# Patient Record
Sex: FEMALE | Race: BLACK OR AFRICAN AMERICAN | NOT HISPANIC OR LATINO | Employment: OTHER | URBAN - METROPOLITAN AREA
[De-identification: names, ages, dates, MRNs, and addresses within clinical notes are randomized per-mention and may not be internally consistent; named-entity substitution may affect disease eponyms.]

---

## 2017-01-02 RX ORDER — MYCOPHENOLATE MOFETIL 500 MG/1
TABLET ORAL
Qty: 60 TABLET | Refills: 0 | Status: SHIPPED | OUTPATIENT
Start: 2017-01-02 | End: 2017-01-31 | Stop reason: SDUPTHER

## 2017-01-27 ENCOUNTER — TELEPHONE (OUTPATIENT)
Dept: TRANSPLANT | Facility: CLINIC | Age: 67
End: 2017-01-27

## 2017-01-31 RX ORDER — MYCOPHENOLATE MOFETIL 500 MG/1
500 TABLET ORAL 2 TIMES DAILY
Qty: 60 TABLET | Refills: 0 | Status: SHIPPED | OUTPATIENT
Start: 2017-01-31 | End: 2017-03-01 | Stop reason: SDUPTHER

## 2017-02-01 ENCOUNTER — TELEPHONE (OUTPATIENT)
Dept: TRANSPLANT | Facility: CLINIC | Age: 67
End: 2017-02-01

## 2017-02-01 NOTE — TELEPHONE ENCOUNTER
----- Message from April Mackey sent at 2/1/2017  4:22 PM CST -----  Contact: pt  I tried to call her back, but the phone rings and rings ( no voicemail )      ----- Message -----     From: Fatemeh Blanco     Sent: 1/31/2017   4:07 PM       To: April Mackey    She is due for labs, can you let her know?  Thanks.  ----- Message -----     From: Inge Chakraborty     Sent: 1/31/2017   3:38 PM       To: [facility] Post-Liver Transplant Clinical    When is her next appt for labs please call her @ # [phone]

## 2017-02-01 NOTE — TELEPHONE ENCOUNTER
----- Message from Fatemeh Blanco sent at 1/31/2017  4:07 PM CST -----  Contact: pt       ----- Message -----     From: Samantha Rodriges     Sent: 1/31/2017   3:49 PM       To: [facility] Post-Liver Transplant Clinical    ..Requesting prescription refill formycophenolate (CELLCEPT) 500 mg Tab, call once filled   [phone]

## 2017-02-15 ENCOUNTER — TELEPHONE (OUTPATIENT)
Dept: TRANSPLANT | Facility: CLINIC | Age: 67
End: 2017-02-15

## 2017-02-16 ENCOUNTER — TELEPHONE (OUTPATIENT)
Dept: TRANSPLANT | Facility: CLINIC | Age: 67
End: 2017-02-16

## 2017-02-16 NOTE — TELEPHONE ENCOUNTER
----- Message from April Mackey sent at 2/16/2017 10:14 AM CST -----  Contact: pt  Done, she's going to the lab Monday 2/20/17    ----- Message -----     From: April Mackey     Sent: 2/15/2017  12:20 PM       To: April Billings    I tried to call the patient, but her phone just rings. Do you want me to just schedule her an appt  with Yaneli . She hasn't been seen since 2015    ----- Message -----     From: April Mackey     Sent: 2/1/2017   4:22 PM       To: April Billings    I tried to call her back, but the phone rings and rings ( no voicemail )      ----- Message -----     From: Fatemeh Blanco     Sent: 1/31/2017   4:07 PM       To: April Mackey    She is due for labs, can you let her know?  Thanks.  ----- Message -----     From: Inge Chakraborty     Sent: 1/31/2017   3:38 PM       To: [facility] Post-Liver Transplant Clinical    When is her next appt for labs please call her @ # [phone]

## 2017-02-16 NOTE — TELEPHONE ENCOUNTER
----- Message from April Mackey sent at 2/16/2017 10:06 AM CST -----  I just spoke to the patient, I informed her that I scheduled her an appt with Yaneli on March 16, 2017 ( one month away). Ms. Stock said she can't always get a ride here because her two sisters work, I told her that's the reason I scheduled the appt a month from today so she could get her ride in order. I also informed her that it is important that she comes to this appt since she hasn't been seen by a Doctor since 2015.      ----- Message -----     From: Fatemeh Blanco     Sent: 2/15/2017  12:45 PM       To: April Mackey    You can schedule her with Yaneli for her annual. Thanks.

## 2017-02-27 RX ORDER — MYCOPHENOLATE MOFETIL 500 MG/1
TABLET ORAL
Qty: 180 TABLET | Refills: 3 | Status: CANCELLED | OUTPATIENT
Start: 2017-02-27

## 2017-03-01 RX ORDER — MYCOPHENOLATE MOFETIL 500 MG/1
500 TABLET ORAL 2 TIMES DAILY
Qty: 60 TABLET | Refills: 0 | Status: SHIPPED | OUTPATIENT
Start: 2017-03-01 | End: 2017-04-04 | Stop reason: SDUPTHER

## 2017-03-01 NOTE — TELEPHONE ENCOUNTER
----- Message from Samantha Rodriges sent at 2/27/2017  3:31 PM CST -----  Contact: melody Wong.Requesting prescription refill for mycophenolate (CELLCEPT) 500 mg Tab,  [phone]

## 2017-03-03 DIAGNOSIS — Z94.4 S/P LIVER TRANSPLANT: Primary | ICD-10-CM

## 2017-03-03 RX ORDER — TACROLIMUS 1 MG/1
CAPSULE ORAL
Qty: 720 CAPSULE | Refills: 0 | Status: SHIPPED | OUTPATIENT
Start: 2017-03-03 | End: 2017-06-05 | Stop reason: SDUPTHER

## 2017-03-10 LAB
EXT ALBUMIN: 4.1
EXT ALKALINE PHOSPHATASE: 63
EXT ALT: 8
EXT AST: 14
EXT BILIRUBIN TOTAL: 0.4
EXT BUN: 30
EXT CALCIUM: 9
EXT CHLORIDE: 108
EXT CO2: 25
EXT CREATININE: 1.4 MG/DL
EXT GFR MDRD AF AMER: 48
EXT GGT: 12
EXT GLUCOSE: 104
EXT HEMATOCRIT: 31.4
EXT HEMOGLOBIN: 10.1
EXT LYMPH%: 27.1
EXT MONOCYTES%: 6.6
EXT PLATELETS: 140
EXT POTASSIUM: 3.9
EXT PROTEIN TOTAL: 6.3
EXT SEGS%: 66.3
EXT SODIUM: 142 MMOL/L
EXT TACROLIMUS LVL: 3.6
EXT WBC: 3.1

## 2017-03-15 ENCOUNTER — TELEPHONE (OUTPATIENT)
Dept: TRANSPLANT | Facility: CLINIC | Age: 67
End: 2017-03-15

## 2017-03-16 ENCOUNTER — OFFICE VISIT (OUTPATIENT)
Dept: TRANSPLANT | Facility: CLINIC | Age: 67
End: 2017-03-16
Payer: MEDICARE

## 2017-03-16 VITALS
SYSTOLIC BLOOD PRESSURE: 137 MMHG | OXYGEN SATURATION: 100 % | BODY MASS INDEX: 27.95 KG/M2 | DIASTOLIC BLOOD PRESSURE: 86 MMHG | RESPIRATION RATE: 16 BRPM | HEART RATE: 82 BPM | TEMPERATURE: 98 F | HEIGHT: 65 IN | WEIGHT: 167.75 LBS

## 2017-03-16 DIAGNOSIS — Z94.4 S/P LIVER TRANSPLANT: Primary | ICD-10-CM

## 2017-03-16 DIAGNOSIS — D84.9 IMMUNOSUPPRESSION: ICD-10-CM

## 2017-03-16 PROCEDURE — 99214 OFFICE O/P EST MOD 30 MIN: CPT | Mod: S$PBB,,, | Performed by: NURSE PRACTITIONER

## 2017-03-16 PROCEDURE — 99213 OFFICE O/P EST LOW 20 MIN: CPT | Mod: PBBFAC | Performed by: NURSE PRACTITIONER

## 2017-03-16 PROCEDURE — 99999 PR PBB SHADOW E&M-EST. PATIENT-LVL III: CPT | Mod: PBBFAC,,, | Performed by: NURSE PRACTITIONER

## 2017-03-16 RX ORDER — OMEPRAZOLE 40 MG/1
40 CAPSULE, DELAYED RELEASE ORAL DAILY
COMMUNITY
End: 2019-05-09 | Stop reason: SDUPTHER

## 2017-03-16 NOTE — PROGRESS NOTES
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    ORGAN: LIVER  Whole or Partial: whole liver  Donor Type:  CDC High Risk:   Donor CMV Status: Positive  Donor HCV Status: Negative  Donor HBcAb: Negative  Biliary Anastomosis:   Arterial Anatomy:   IVC reconstruction:  Portal vein status:     She has had the following complications since transplant: none.     Subjective:     Interval History: Chetna was last seen on 8/2015. Currently, she is doing well. Current complaints include none.    Patient has done well after transplant with good allograft function although she has hx of noncompliance with medications at times.   Denies jaundice, dark urine, abdominal pain or distension, edema      Review of Systems   Constitutional: Negative for activity change, appetite change, chills, diaphoresis, fatigue, fever and unexpected weight change.   HENT: Negative for facial swelling and nosebleeds.    Respiratory: Negative for cough, chest tightness and shortness of breath.    Cardiovascular: Negative for chest pain, palpitations and leg swelling.   Gastrointestinal: Negative for abdominal distention, abdominal pain, blood in stool, constipation, diarrhea, nausea and vomiting.   Musculoskeletal: Negative for neck pain and neck stiffness.   Skin: Negative for color change, pallor and rash.   Neurological: Negative for dizziness, tremors, syncope, weakness, light-headedness and headaches.   Hematological: Negative for adenopathy. Does not bruise/bleed easily.   Psychiatric/Behavioral: Negative for agitation, behavioral problems, confusion and decreased concentration.       Objective:     Physical Exam   Constitutional: She is oriented to person, place, and time. She appears well-developed and well-nourished. No distress.   HENT:   Head: Normocephalic  and atraumatic.   Eyes: Conjunctivae are normal. Pupils are equal, round, and reactive to light. No scleral icterus.   Neck: Normal range of motion. Neck supple.   Cardiovascular: Normal rate.    Pulmonary/Chest: Effort normal.   Abdominal: Soft. She exhibits no distension and no mass. There is no tenderness. There is no rebound and no guarding.   Musculoskeletal: Normal range of motion.   Neurological: She is alert and oriented to person, place, and time. No cranial nerve deficit. She exhibits normal muscle tone. Coordination normal.   No asterixis   Skin: Skin is warm and dry. No rash noted. She is not diaphoretic. No erythema. No pallor.        Surgical incision healed well, no hernia appreciated   Psychiatric: She has a normal mood and affect. Her behavior is normal. Judgment and thought content normal.     Lab Results   Component Value Date    BILITOT 1.0 06/07/2004     (H) 06/07/2004     (H) 06/07/2004    ALKPHOS 342 (H) 06/07/2004    CREATININE 1.3 06/07/2004    ALBUMIN 4.7 06/07/2004     Lab Results   Component Value Date    WBC 2.96 (L) 06/07/2004    HGB 11.2 (L) 06/07/2004    HCT 35.0 (L) 06/07/2004     (L) 06/07/2004     Lab Results   Component Value Date    TACROLIMUS 5.4 06/07/2004       Assessment/Plan:     1. S/P liver transplant    2. Immunosuppression        S/P liver transplant due to PSC : Normal LFTs. Continue monitoring symptoms, labs and drug levels for drug-related toxicity and side effects  -continue with post transplant labs per protocol  IS: Chronic immunosuppressive medications for rejection prophylaxis at target.   no adjustment needed.   Maintenance:  -Instructed to f/u with PCP for regular health maintenance care including cancer screenings and BMD  -Reviewed need to avoid sun exposure with use of sunblock, hats, long sleeves related to increased risk of skin cancers  -Recommend f/u with Dermatology for annual skin checks    RTC 1 year    Yaneli Angel NP            UNOS Patient Status  Functional Status: 100% - Normal, no complaints, no evidence of disease  Physical Capacity: No Limitations

## 2017-03-16 NOTE — LETTER
March 16, 2017        JONATHAN Claudio  [address]  [place] [address]  Phone: [phone]     Dano Rivas  [address] [place], [place] [address]  [place] [address]             [facility] - Liver Transplant  [address]  [facility] [address]  Phone: [phone]   Patient: Chetna Stock   MR Number: [id number]   YOB: 1950   Date of Visit: 3/16/2017       Dear Dr. JONATHAN Claudio, Dano Rivas    Thank you for referring Chetna Stock to me for evaluation. Attached you will find relevant portions of my assessment and plan of care.    If you have questions, please do not hesitate to call me. I look forward to following Chetna Stock along with you.    Sincerely,    Yaneli Angel, KIRILL    Enclosure    If you would like to receive this communication electronically, please contact [email or web address] or [phone] to request [facility] access.    [facility] is a tool which provides read-only access to select patient information with whom you have a relationship. Its easy to use and provides real time access to review your patients record including encounter summaries, notes, results, and demographic information.    If you feel you have received this communication in error or would no longer like to receive these types of communications, please e-mail [email or web address]

## 2017-04-05 RX ORDER — MYCOPHENOLATE MOFETIL 500 MG/1
500 TABLET ORAL 2 TIMES DAILY
Qty: 60 TABLET | Refills: 11 | Status: SHIPPED | OUTPATIENT
Start: 2017-04-05 | End: 2018-02-19 | Stop reason: SDUPTHER

## 2017-04-22 NOTE — TELEPHONE ENCOUNTER
Suture or Staple Removal  You were seen today for a suture or staple removal. Your wound is healing as expected. The wound has healed well enough that the sutures or staples can be removed. The wound will continue to heal for the next few months.  At this time there is no sign of infection.   Home care  · If you have pain, take pain medicine as advised by your healthcare provider.   · Keep your wound clean and protected by covering it with a bandage for the next week or so.   · Wash your hands with soap and warm water before and after caring for your wound. This helps prevent infection.  · Clean the wound gently with soap and warm water daily or as directed by your child’s health care provider. Do not use iodine, alcohol, or other cleansers on the wound.  Gently pat it dry. Put on a new bandage, if needed. Do not reuse bandages.  · If the area gets wet, gently pat it dry with a clean cloth. Replace the wet bandage with a dry one.  · Check the wound daily for signs of infection. (These are listed under \"When to seek medical advice\" below.)  · You may shower and bathe as usual. Swimming is now permitted.  Follow-up care  Follow up with your healthcare provider as advised.  When to seek medical advice   Call your healthcare provider if any of the following occur:  · Wound reopens or bleeds  · Signs of an infection, such as:  ¨ Increasing redness or swelling around the wound  ¨ Increased warmth from the wound  ¨ Worsening pain  ¨ Red streaking lines away from the wound  ¨ Fluid draining from the wound  · Fever of 100.4°F (38°C) or higher, or as directed by your child's healthcare provider  © 5682-7975 [employer]. [address] [place] [address]. All rights reserved. This information is not intended as a substitute for professional medical care. Always follow your healthcare professional's instructions.         Refill request sent to MD for review and signature.

## 2017-05-17 ENCOUNTER — TELEPHONE (OUTPATIENT)
Dept: TRANSPLANT | Facility: CLINIC | Age: 67
End: 2017-05-17

## 2017-06-05 DIAGNOSIS — Z94.4 S/P LIVER TRANSPLANT: ICD-10-CM

## 2017-06-05 RX ORDER — TACROLIMUS 1 MG/1
4 CAPSULE ORAL EVERY 12 HOURS
Qty: 720 CAPSULE | Refills: 0 | Status: SHIPPED | OUTPATIENT
Start: 2017-06-05 | End: 2017-07-14 | Stop reason: SDUPTHER

## 2017-06-05 NOTE — TELEPHONE ENCOUNTER
----- Message from Inge Chakraborty sent at 6/5/2017 10:51 AM CDT -----  Contact: pt  Her tacrolimus (PROGRAF) 1 MG Cap is almost out goes to the [facility] please call her if needed @ # [phone].

## 2017-07-14 DIAGNOSIS — Z94.4 S/P LIVER TRANSPLANT: ICD-10-CM

## 2017-07-16 RX ORDER — TACROLIMUS 1 MG/1
4 CAPSULE ORAL EVERY 12 HOURS
Qty: 720 CAPSULE | Refills: 0 | Status: SHIPPED | OUTPATIENT
Start: 2017-07-16 | End: 2017-11-16 | Stop reason: SDUPTHER

## 2017-07-20 ENCOUNTER — TELEPHONE (OUTPATIENT)
Dept: TRANSPLANT | Facility: CLINIC | Age: 67
End: 2017-07-20

## 2017-08-11 ENCOUNTER — TELEPHONE (OUTPATIENT)
Dept: TRANSPLANT | Facility: CLINIC | Age: 67
End: 2017-08-11

## 2017-09-15 ENCOUNTER — TELEPHONE (OUTPATIENT)
Dept: TRANSPLANT | Facility: CLINIC | Age: 67
End: 2017-09-15

## 2017-09-25 ENCOUNTER — TELEPHONE (OUTPATIENT)
Dept: TRANSPLANT | Facility: CLINIC | Age: 67
End: 2017-09-25

## 2017-09-25 NOTE — TELEPHONE ENCOUNTER
Per : Patient lab reported she did not go to lab as scheduled. Missed Lab Letter sent to patient.

## 2017-10-03 ENCOUNTER — TELEPHONE (OUTPATIENT)
Dept: TRANSPLANT | Facility: CLINIC | Age: 67
End: 2017-10-03

## 2017-10-03 NOTE — TELEPHONE ENCOUNTER
Patient notified that recent lab results have not been received. She is going to call lab and have them contact our office, she said.

## 2017-10-03 NOTE — TELEPHONE ENCOUNTER
----- Message from Inge Chakraborty sent at 10/3/2017  2:06 PM CDT -----  Contact: pt  Did have labs done and told them to fax it to us and is checking that we received it, please call her @ # [phone].

## 2017-10-04 LAB
ERYTHROCYTE [SEDIMENTATION RATE] IN BLOOD: 29 MM/H
EXT ALBUMIN: 4.2
EXT ALBUMIN: 4.3
EXT ALKALINE PHOSPHATASE: 148
EXT ALKALINE PHOSPHATASE: 65
EXT ALT: 10
EXT ALT: 39
EXT AST: 14
EXT AST: 46
EXT BILIRUBIN TOTAL: 0.4
EXT BILIRUBIN TOTAL: 0.5
EXT BUN: 26
EXT BUN: 28
EXT CALCIUM: 8.7
EXT CALCIUM: 9.2
EXT CHLORIDE: 107
EXT CHLORIDE: 97
EXT CO2: 24
EXT CO2: 25
EXT CREATININE: 1.4 MG/DL
EXT CREATININE: 1.6 MG/DL
EXT GGT: 225
EXT GLUCOSE: 97
EXT GLUCOSE: 98
EXT HEMATOCRIT: 29
EXT HEMATOCRIT: 31.3
EXT HEMOGLOBIN: 9
EXT HEMOGLOBIN: 9.7
EXT INR: 1
EXT INR: 1.1
EXT LYMPH%: 19
EXT LYMPH%: 26
EXT MONOCYTES%: 10.9
EXT MONOCYTES%: 9.4
EXT PLATELETS: 115
EXT PLATELETS: 141
EXT POTASSIUM: 4
EXT POTASSIUM: 4.3
EXT PROTEIN TOTAL: 6.3
EXT PROTEIN TOTAL: 6.6
EXT PT: 11.1
EXT PT: 11.6
EXT SEGS%: 64.6
EXT SEGS%: 70.1
EXT SODIUM: 141 MMOL/L
EXT SODIUM: 141 MMOL/L
EXT TACROLIMUS LVL: 3.5
EXT TACROLIMUS LVL: 4.5
EXT WBC: 3
EXT WBC: 3.7

## 2017-10-06 ENCOUNTER — TELEPHONE (OUTPATIENT)
Dept: TRANSPLANT | Facility: CLINIC | Age: 67
End: 2017-10-06

## 2017-10-06 NOTE — TELEPHONE ENCOUNTER
----- Message from Catina Mcgrath MD sent at 10/6/2017  1:23 PM CDT -----  Repeat labs now- please let patient know.

## 2017-10-06 NOTE — TELEPHONE ENCOUNTER
Attempted to call patient; no answer. Will continue to try to reach patient with instructions to repeat labs .

## 2017-10-06 NOTE — TELEPHONE ENCOUNTER
Reached patient by phone; instructed her to have repeat labs , she will go on Monday she said.  Informed her may need MRCP/Liver biopsy depending on results as per VORB review with . She was able to voice understanding

## 2017-10-09 ENCOUNTER — TELEPHONE (OUTPATIENT)
Dept: TRANSPLANT | Facility: CLINIC | Age: 67
End: 2017-10-09

## 2017-10-09 NOTE — TELEPHONE ENCOUNTER
----- Message from Mercedes Boateng sent at 10/9/2017 11:47 AM CDT -----  Contact: Pt  Pt had lab work done in [place] today, would like results when they are received    Pt contact number [phone]    Thanks

## 2017-10-11 ENCOUNTER — TELEPHONE (OUTPATIENT)
Dept: TRANSPLANT | Facility: CLINIC | Age: 67
End: 2017-10-11

## 2017-10-11 LAB
EXT ALBUMIN: 4.1
EXT ALKALINE PHOSPHATASE: 84
EXT ALT: 8
EXT AST: 15
EXT BILIRUBIN TOTAL: 0.3
EXT BUN: 36
EXT CALCIUM: 9
EXT CHLORIDE: 108
EXT CO2: 23
EXT CREATININE: 1.7 MG/DL
EXT GFR MDRD AF AMER: 39
EXT GGT: 53
EXT GLUCOSE: 98
EXT HEMATOCRIT: 31
EXT HEMOGLOBIN: 10
EXT LYMPH%: 18.5
EXT MONOCYTES%: 11.3
EXT PLATELETS: 131
EXT POTASSIUM: 4
EXT PROTEIN TOTAL: 6.7
EXT SEGS%: 70.2
EXT SODIUM: 140 MMOL/L
EXT TACROLIMUS LVL: 3.4
EXT WBC: 3.2

## 2017-10-11 NOTE — TELEPHONE ENCOUNTER
----- Message from Krista Huertas sent at 10/11/2017  8:42 AM CDT -----  Contact: PT  Requesting lab results, call

## 2017-10-11 NOTE — TELEPHONE ENCOUNTER
Returned call no lab results from Monday.  I will call the labs and have them send results.  I told patient I would call her back.

## 2017-10-24 ENCOUNTER — TELEPHONE (OUTPATIENT)
Dept: TRANSPLANT | Facility: CLINIC | Age: 67
End: 2017-10-24

## 2017-10-24 NOTE — TELEPHONE ENCOUNTER
Reviewed recent labs with patient : Alk Phos down to WNL range. Awaiting review by . Patient voiced understanding.

## 2017-10-24 NOTE — TELEPHONE ENCOUNTER
----- Message from Sarah Echevarria sent at 10/24/2017 12:42 PM CDT -----  Contact: patient   Calling to ask a few questions about her case.         Please call

## 2017-10-26 ENCOUNTER — TELEPHONE (OUTPATIENT)
Dept: TRANSPLANT | Facility: CLINIC | Age: 67
End: 2017-10-26

## 2017-10-26 NOTE — TELEPHONE ENCOUNTER
----- Message from Catina Mcgrath MD sent at 10/24/2017  8:04 AM CDT -----  The Labs are stable - please let patient know.

## 2017-11-16 DIAGNOSIS — Z94.4 S/P LIVER TRANSPLANT: ICD-10-CM

## 2017-11-16 RX ORDER — TACROLIMUS 1 MG/1
4 CAPSULE ORAL EVERY 12 HOURS
Qty: 720 CAPSULE | Refills: 11 | Status: SHIPPED | OUTPATIENT
Start: 2017-11-16 | End: 2017-11-16 | Stop reason: SDUPTHER

## 2017-11-16 RX ORDER — TACROLIMUS 1 MG/1
4 CAPSULE ORAL EVERY 12 HOURS
Qty: 720 CAPSULE | Refills: 11 | Status: SHIPPED | OUTPATIENT
Start: 2017-11-16 | End: 2018-06-12 | Stop reason: DRUGHIGH

## 2018-01-19 LAB
EXT ALBUMIN: 4.1
EXT ALKALINE PHOSPHATASE: 71
EXT ALT: 8
EXT AST: 14
EXT BILIRUBIN TOTAL: 0.3
EXT BUN: 31
EXT CALCIUM: 8.8
EXT CHLORIDE: 110
EXT CO2: 22
EXT CREATININE: 1.5 MG/DL
EXT GFR MDRD AF AMER: 44
EXT GGT: 11
EXT GLUCOSE: 99
EXT HEMATOCRIT: 31.1
EXT HEMOGLOBIN: 9.6
EXT LYMPH%: 22.9
EXT MONOCYTES%: 11.7
EXT PLATELETS: 113
EXT POTASSIUM: 4.4
EXT PROTEIN TOTAL: 6.2
EXT SEGS%: 65.4
EXT SODIUM: 141 MMOL/L
EXT TACROLIMUS LVL: 4.6
EXT WBC: 3.6

## 2018-01-24 ENCOUNTER — TELEPHONE (OUTPATIENT)
Dept: TRANSPLANT | Facility: CLINIC | Age: 68
End: 2018-01-24

## 2018-01-24 NOTE — TELEPHONE ENCOUNTER
----- Message from Catina Mcgrath MD sent at 1/19/2018 10:09 PM CST -----  The Labs are stable - please let patient know.

## 2018-01-24 NOTE — TELEPHONE ENCOUNTER
----- Message from Krista Huertas sent at 1/24/2018  8:34 AM CST -----  Contact: Pt  States coordinator should receive her labs today.    If any questions, call

## 2018-02-21 RX ORDER — MYCOPHENOLATE MOFETIL 500 MG/1
500 TABLET ORAL 2 TIMES DAILY
Qty: 60 TABLET | Refills: 11 | Status: SHIPPED | OUTPATIENT
Start: 2018-02-21 | End: 2018-08-08 | Stop reason: SDUPTHER

## 2018-03-07 DIAGNOSIS — Z94.4 LIVER REPLACED BY TRANSPLANT: ICD-10-CM

## 2018-03-07 DIAGNOSIS — M89.9 BONE DISORDER: Primary | ICD-10-CM

## 2018-04-09 ENCOUNTER — TELEPHONE (OUTPATIENT)
Dept: TRANSPLANT | Facility: CLINIC | Age: 68
End: 2018-04-09

## 2018-04-09 NOTE — TELEPHONE ENCOUNTER
----- Message from April Mackey sent at 4/9/2018  1:28 PM CDT -----  Contact: patient   Done. I spoke to Ms. Stock she is going to have her labs drawn this Thursday 4/12/18    ----- Message -----  From: Mandie Mackey  Sent: 4/9/2018   8:39 AM  To: [facility] Post-Liver Transplant Non-Clinical    Patient calling to schedule her lab work.        Please call [phone]          Thanks

## 2018-05-16 DIAGNOSIS — Z94.4 LIVER REPLACED BY TRANSPLANT: Primary | ICD-10-CM

## 2018-05-17 ENCOUNTER — TELEPHONE (OUTPATIENT)
Dept: TRANSPLANT | Facility: CLINIC | Age: 68
End: 2018-05-17

## 2018-05-17 NOTE — TELEPHONE ENCOUNTER
Received fax from [facility] reporting patient did not have labs on 4/30/18. Attempted to contact patient , no answer.

## 2018-06-11 ENCOUNTER — TELEPHONE (OUTPATIENT)
Dept: TRANSPLANT | Facility: CLINIC | Age: 68
End: 2018-06-11

## 2018-06-11 LAB
EXT ALBUMIN: 4.1
EXT ALKALINE PHOSPHATASE: 73
EXT ALT: 9
EXT AST: 13
EXT BILIRUBIN TOTAL: 0.3
EXT BUN: 38
EXT CALCIUM: 8.6
EXT CHLORIDE: 108
EXT CHOLESTEROL: 184
EXT CO2: 24
EXT CREATININE: 1.8 MG/DL
EXT GFR MDRD AF AMER: 35
EXT GGT: 14
EXT GLUCOSE: 103
EXT HDL: 40
EXT HEMATOCRIT: 32
EXT HEMOGLOBIN: 10
EXT LDL CHOLESTEROL: 118
EXT LYMPH%: 26.5
EXT MONOCYTES%: 13
EXT PLATELETS: 130
EXT POTASSIUM: 3.7
EXT PROTEIN TOTAL: 6.5
EXT SEGS%: 60.5
EXT SODIUM: 143 MMOL/L
EXT TACROLIMUS LVL: 4
EXT TRIGLYCERIDES: 151
EXT WBC: 3.3

## 2018-06-12 DIAGNOSIS — Z94.4 S/P LIVER TRANSPLANT: ICD-10-CM

## 2018-06-12 NOTE — TELEPHONE ENCOUNTER
----- Message from Catina Mcgrath MD sent at 6/11/2018  4:28 PM CDT -----  Decrease prograf to 4/3 from 4/4 and recheck labs in one month - please let patient know.

## 2018-06-13 RX ORDER — TACROLIMUS 1 MG/1
CAPSULE ORAL
Qty: 210 CAPSULE | Refills: 11
Start: 2018-06-13 | End: 2019-01-07 | Stop reason: SDUPTHER

## 2018-06-14 LAB
EXT ALBUMIN: 4.1
EXT ALKALINE PHOSPHATASE: 65
EXT ALT: 8
EXT AST: 14
EXT BILIRUBIN TOTAL: 0.3
EXT BUN: 34
EXT CALCIUM: 8.4
EXT CHLORIDE: 111
EXT CO2: 23
EXT CREATININE: 1.6 MG/DL
EXT GFR MDRD AF AMER: 39.1
EXT GGT: 11
EXT GLUCOSE: 104
EXT HEMATOCRIT: 30
EXT HEMOGLOBIN: 9.4
EXT LYMPH%: 22.5
EXT MONOCYTES%: 8.4
EXT PLATELETS: 126
EXT POTASSIUM: 4
EXT PROTEIN TOTAL: 6.6
EXT SEGS%: 69.1
EXT SODIUM: 143 MMOL/L
EXT TACROLIMUS LVL: 3.8
EXT WBC: 3.1

## 2018-07-23 ENCOUNTER — TELEPHONE (OUTPATIENT)
Dept: TRANSPLANT | Facility: CLINIC | Age: 68
End: 2018-07-23

## 2018-07-23 NOTE — TELEPHONE ENCOUNTER
----- Message from Catalina Rojas sent at 7/23/2018  9:43 AM CDT -----  Needs Advice    Reason for call:      Communication Preference: [phone]  Additional Information: pt will get blood work done this week

## 2018-08-08 DIAGNOSIS — Z94.4 S/P LIVER TRANSPLANT: Primary | ICD-10-CM

## 2018-08-08 RX ORDER — MYCOPHENOLATE MOFETIL 500 MG/1
500 TABLET ORAL 2 TIMES DAILY
Qty: 60 TABLET | Refills: 11 | Status: SHIPPED | OUTPATIENT
Start: 2018-08-08 | End: 2018-08-16 | Stop reason: CLARIF

## 2018-08-08 NOTE — TELEPHONE ENCOUNTER
----- Message from Bailee Marcos RN sent at 8/8/2018  1:31 PM CDT -----  Contact:  Linda Crane       ----- Message -----  From: Jeannine Clarke  Sent: 8/8/2018  12:22 PM  To: [facility] Post-Kidney Transplant Clinical    Pharmacy Calling    Reason for call: new PA needed to fill doasge   Pharmacy Name: mycophenolate (CELLCEPT) 500 mg Tab  Prescription Name: Royce  Phone Number: Linda [phone] op 3  Additional Information: fax [phone]

## 2018-08-15 ENCOUNTER — TELEPHONE (OUTPATIENT)
Dept: TRANSPLANT | Facility: CLINIC | Age: 68
End: 2018-08-15

## 2018-08-15 LAB
EXT ALBUMIN: 4.2
EXT ALKALINE PHOSPHATASE: 65
EXT ALT: 7
EXT AST: 12
EXT BILIRUBIN TOTAL: 0.4
EXT BUN: 41
EXT CALCIUM: 8.8
EXT CHLORIDE: 108
EXT CO2: 22
EXT CREATININE: 1.8 MG/DL
EXT GFR MDRD AF AMER: 33
EXT GLUCOSE: 101
EXT HEMATOCRIT: 29.6
EXT HEMOGLOBIN: 9.6
EXT INR: 1.1
EXT LYMPH%: 24.5
EXT MONOCYTES%: 10.4
EXT PLATELETS: 136
EXT POTASSIUM: 4.1
EXT PROTEIN TOTAL: 5.8
EXT PT: 29
EXT SEGS%: 65.1
EXT SODIUM: 142 MMOL/L
EXT TACROLIMUS LVL: 3.5
EXT WBC: 3.2

## 2018-08-15 NOTE — TELEPHONE ENCOUNTER
----- Message from Verito Cheng sent at 8/15/2018  9:49 AM CDT -----  Contact: Patuient  Needs Advice    Reason for call:    Patient stated her Orthopedic Doctor has prescribe patient meloxicam 15 MG tablets and methylprednisolone 4MG tablets. She wants to make safe their safe to take.   Communication Preference: [phone]  Additional Information: n/a

## 2018-08-15 NOTE — TELEPHONE ENCOUNTER
"Returned patient's call; informed her that Methyprednisolone is ok to take but don't recommend use of Meloxicam (it is an NSAID(non-steroidal anti-inlamatory) which can affect her kidney function.  She was able to voice understanding and will review this with her doctor.   Patient at this time reported upon questioning, that she did have her labs drawn recently "early August", she said.  [facility] to  [facility]   "

## 2018-08-16 DIAGNOSIS — Z94.4 S/P LIVER TRANSPLANT: Primary | ICD-10-CM

## 2018-08-16 RX ORDER — MYCOPHENOLATE MOFETIL 250 MG/1
500 CAPSULE ORAL 2 TIMES DAILY
Qty: 120 CAPSULE | Refills: 11 | Status: SHIPPED | OUTPATIENT
Start: 2018-08-16 | End: 2019-01-02 | Stop reason: SDUPTHER

## 2018-08-16 NOTE — TELEPHONE ENCOUNTER
Received call from George at  [facility]; reported that 500mg capsules of Mycophenolate on back order, requesting Rx. For 250mg capsules.  Rx. Sent to MD for review/signature.

## 2018-08-20 ENCOUNTER — TELEPHONE (OUTPATIENT)
Dept: TRANSPLANT | Facility: CLINIC | Age: 68
End: 2018-08-20

## 2018-08-20 NOTE — TELEPHONE ENCOUNTER
----- Message from Catina Mcgrath MD sent at 8/19/2018 10:57 AM CDT -----  The Labs are stable - please let patient know.

## 2018-10-09 ENCOUNTER — TELEPHONE (OUTPATIENT)
Dept: TRANSPLANT | Facility: CLINIC | Age: 68
End: 2018-10-09

## 2018-10-09 NOTE — TELEPHONE ENCOUNTER
"Patient reported she is in need of "neck surgery" because of "a bone in my neck is deteriorated".  Patient asked if ok to have.   Instructed patient that she is due for her annual check up with liver transplant and can be evaluated for clearence for neck surgery at that time. Patient reported she will work on getting transportation and will call back to make the appointment..   "

## 2018-10-09 NOTE — TELEPHONE ENCOUNTER
----- Message from Rodney Lennon sent at 10/9/2018 10:06 AM CDT -----  Contact: Patient  Needs Advice    Reason for call: Pt has questions before scheduling a procedure        Communication Preference: [phone]    Additional Information:

## 2018-10-15 ENCOUNTER — TELEPHONE (OUTPATIENT)
Dept: TRANSPLANT | Facility: CLINIC | Age: 68
End: 2018-10-15

## 2018-10-15 NOTE — TELEPHONE ENCOUNTER
Spoke to patient;' appointment scheduled to see NP for annual check and for pre-op clearence for neck surgery.

## 2018-10-15 NOTE — TELEPHONE ENCOUNTER
----- Message from Nilsa Bowers MA sent at 10/15/2018  2:01 PM CDT -----  Contact: Patient      ----- Message -----  From: Fatemeh Blanco  Sent: 10/15/2018   1:18 PM  To: Nilsa Bowers MA    Patient calling to schedule an appointment  ----- Message -----  From: Nilsa Bowers MA  Sent: 10/15/2018   1:15 PM  To: Fatemeh Balnco    Pt would like to speak with you   ----- Message -----  From: Rodney Lennon  Sent: 10/15/2018   9:47 AM  To: [facility] Post-Liver Transplant Non-Clinical    Needs Advice    Reason for call: Calling to speak with coordinator to schedule an appt        Communication Preference: [phone]    Additional Information:

## 2018-10-16 DIAGNOSIS — Z94.4 S/P LIVER TRANSPLANT: ICD-10-CM

## 2018-10-17 RX ORDER — TACROLIMUS 1 MG/1
4 CAPSULE ORAL EVERY 12 HOURS
Qty: 720 CAPSULE | Refills: 10 | Status: SHIPPED | OUTPATIENT
Start: 2018-10-17 | End: 2018-11-19 | Stop reason: SDUPTHER

## 2018-10-19 ENCOUNTER — TELEPHONE (OUTPATIENT)
Dept: TRANSPLANT | Facility: CLINIC | Age: 68
End: 2018-10-19

## 2018-10-19 LAB
EXT CHOLESTEROL: 160
EXT HDL: 36
EXT LDL CHOLESTEROL: 100
EXT TACROLIMUS LVL: 4.7
EXT TRIGLYCERIDES: 147

## 2018-11-19 ENCOUNTER — PROCEDURE VISIT (OUTPATIENT)
Dept: HEPATOLOGY | Facility: CLINIC | Age: 68
End: 2018-11-19
Attending: NURSE PRACTITIONER
Payer: MEDICARE

## 2018-11-19 ENCOUNTER — LAB VISIT (OUTPATIENT)
Dept: LAB | Facility: HOSPITAL | Age: 68
End: 2018-11-19
Payer: MEDICARE

## 2018-11-19 ENCOUNTER — OFFICE VISIT (OUTPATIENT)
Dept: TRANSPLANT | Facility: CLINIC | Age: 68
End: 2018-11-19
Payer: MEDICARE

## 2018-11-19 VITALS
BODY MASS INDEX: 26.89 KG/M2 | TEMPERATURE: 98 F | HEIGHT: 65 IN | SYSTOLIC BLOOD PRESSURE: 151 MMHG | WEIGHT: 161.38 LBS | OXYGEN SATURATION: 99 % | DIASTOLIC BLOOD PRESSURE: 95 MMHG | HEART RATE: 74 BPM | RESPIRATION RATE: 17 BRPM

## 2018-11-19 DIAGNOSIS — Z94.4 S/P LIVER TRANSPLANT: ICD-10-CM

## 2018-11-19 DIAGNOSIS — Z94.4 S/P LIVER TRANSPLANT: Primary | ICD-10-CM

## 2018-11-19 DIAGNOSIS — I10 ESSENTIAL HYPERTENSION: ICD-10-CM

## 2018-11-19 DIAGNOSIS — M54.2 NECK PAIN: ICD-10-CM

## 2018-11-19 DIAGNOSIS — D84.9 IMMUNOSUPPRESSION: ICD-10-CM

## 2018-11-19 DIAGNOSIS — N18.30 CKD (CHRONIC KIDNEY DISEASE) STAGE 3, GFR 30-59 ML/MIN: ICD-10-CM

## 2018-11-19 DIAGNOSIS — K51.90 ULCERATIVE COLITIS WITHOUT COMPLICATIONS, UNSPECIFIED LOCATION: ICD-10-CM

## 2018-11-19 LAB
ALBUMIN SERPL BCP-MCNC: 3.8 G/DL
ALP SERPL-CCNC: 75 U/L
ALT SERPL W/O P-5'-P-CCNC: 8 U/L
ANION GAP SERPL CALC-SCNC: 9 MMOL/L
AST SERPL-CCNC: 14 U/L
BASOPHILS # BLD AUTO: 0.01 K/UL
BASOPHILS NFR BLD: 0.3 %
BILIRUB SERPL-MCNC: 0.4 MG/DL
BUN SERPL-MCNC: 40 MG/DL
CALCIUM SERPL-MCNC: 9 MG/DL
CHLORIDE SERPL-SCNC: 107 MMOL/L
CO2 SERPL-SCNC: 25 MMOL/L
CREAT SERPL-MCNC: 1.6 MG/DL
DIFFERENTIAL METHOD: ABNORMAL
EOSINOPHIL # BLD AUTO: 0.1 K/UL
EOSINOPHIL NFR BLD: 2.8 %
ERYTHROCYTE [DISTWIDTH] IN BLOOD BY AUTOMATED COUNT: 15.7 %
EST. GFR  (AFRICAN AMERICAN): 37.9 ML/MIN/1.73 M^2
EST. GFR  (NON AFRICAN AMERICAN): 32.9 ML/MIN/1.73 M^2
GLUCOSE SERPL-MCNC: 119 MG/DL
HCT VFR BLD AUTO: 35.5 %
HGB BLD-MCNC: 10.4 G/DL
IMM GRANULOCYTES # BLD AUTO: 0.01 K/UL
IMM GRANULOCYTES NFR BLD AUTO: 0.3 %
LYMPHOCYTES # BLD AUTO: 0.8 K/UL
LYMPHOCYTES NFR BLD: 23.1 %
MCH RBC QN AUTO: 26.9 PG
MCHC RBC AUTO-ENTMCNC: 29.3 G/DL
MCV RBC AUTO: 92 FL
MONOCYTES # BLD AUTO: 0.3 K/UL
MONOCYTES NFR BLD: 9.6 %
NEUTROPHILS # BLD AUTO: 2.1 K/UL
NEUTROPHILS NFR BLD: 63.9 %
NRBC BLD-RTO: 0 /100 WBC
PLATELET # BLD AUTO: 130 K/UL
PMV BLD AUTO: 11.9 FL
POTASSIUM SERPL-SCNC: 3.6 MMOL/L
PROT SERPL-MCNC: 7 G/DL
RBC # BLD AUTO: 3.87 M/UL
SODIUM SERPL-SCNC: 141 MMOL/L
WBC # BLD AUTO: 3.24 K/UL

## 2018-11-19 PROCEDURE — 99215 OFFICE O/P EST HI 40 MIN: CPT | Mod: S$PBB,,, | Performed by: NURSE PRACTITIONER

## 2018-11-19 PROCEDURE — 91200 LIVER ELASTOGRAPHY: CPT | Mod: PBBFAC | Performed by: NURSE PRACTITIONER

## 2018-11-19 PROCEDURE — 99999 PR PBB SHADOW E&M-EST. PATIENT-LVL V: CPT | Mod: PBBFAC,,, | Performed by: NURSE PRACTITIONER

## 2018-11-19 PROCEDURE — 36415 COLL VENOUS BLD VENIPUNCTURE: CPT

## 2018-11-19 PROCEDURE — 85025 COMPLETE CBC W/AUTO DIFF WBC: CPT

## 2018-11-19 PROCEDURE — 80053 COMPREHEN METABOLIC PANEL: CPT

## 2018-11-19 PROCEDURE — 99215 OFFICE O/P EST HI 40 MIN: CPT | Mod: PBBFAC,25 | Performed by: NURSE PRACTITIONER

## 2018-11-19 PROCEDURE — 91200 LIVER ELASTOGRAPHY: CPT | Mod: 26,S$PBB,, | Performed by: NURSE PRACTITIONER

## 2018-11-19 RX ORDER — FERROUS SULFATE 325(65) MG
325 TABLET, DELAYED RELEASE (ENTERIC COATED) ORAL
COMMUNITY

## 2018-11-19 NOTE — PROGRESS NOTES
" Transplant Hepatology  Liver Transplant Recipient Follow-up    Transplant Date: 4/22/2004  UNOS Native Liver Dx: Primary Sclerosing Cholangitis: Ulcerative Colitis    Chetna is here for follow up of her liver transplant.    ORGAN: LIVER  Whole or Partial: whole liver  Donor Type:   CDC High Risk:   Donor CMV Status: Positive  Donor HCV Status: Negative  Donor HBcAb: Negative  Donor HBV DINAH:   Donor HCV DINAH:   Biliary Anastomosis:   Arterial Anatomy:   IVC reconstruction:   Portal vein status:     She has had the following complications since transplant: none. The noted complications are well controlled.    Subjective:     Interval History: Chetna was last seen on 3/2017 by CHARY Angel NP. She is currently 14 years s/p liver transplant for PSC.    Currently, she is doing adequately. Current complaints include chronic neck pain in which she has potential plans for neck surgery with ortho surgeon in [place] for surgery due to "disc deterioration".  Inquiring about hepatology pre-op clearance for neck surgery     Hepatologist: Dr. Mcgrath    Current Immunosuppression: currently taking prograf 4/3, Cellcept 500 mg BID, last prograf level 4.7 10/16/18    Liver allograft function: last outside liver function labs 7/2018     10/16/2018 00:00   EXT Tacrolimus Lvl 4.7      7/27/2018 00:00   EXT Albumin 4.2   EXT Alkaline Phosphatase 65   EXT ALT 7   EXT AST 12   EXT BilirubiN Total 0.4   EXT BUN 41   EXT Creatinine 1.8 (H)   EXT GFR MDRD AF AMER 33 (L)   EXT Glucose 101   EXT INR 1.1   EXT Platelets 136   EXT Potassium 4.1   EXT Sodium 142   EXT WBC 3.2       Kidney function: CKD stage 3, Followed by nephrology locally, pt reports last seen 10/2018    No recurrent infections, cancer, any hospitalizations since last visit     HTN - elevated at exam today, followed by newly established PCP per pt report    Last Fibroscan : none, will do today     HEALTH MAINTENANCE:  1. Last DXA scan: overdue  2. Last dermatology skin " assessment - last 7/2017 locally, overdue  3. A1c - per PCP   4. Pap smear/ mammogram: per PCP      Review of Systems   Constitutional: Negative for activity change, appetite change, chills, fatigue, fever and unexpected weight change.   HENT: Negative.    Eyes: Negative.    Respiratory: Negative for cough and shortness of breath.    Cardiovascular: Negative for chest pain and leg swelling.   Gastrointestinal: Negative for abdominal distention, abdominal pain, constipation, diarrhea, nausea and vomiting.   Endocrine: Negative.    Genitourinary: Negative for dysuria, flank pain and urgency.   Musculoskeletal: Positive for neck pain (chronic).   Skin: Negative for rash and wound.   Allergic/Immunologic: Positive for immunocompromised state.   Neurological: Negative for dizziness, weakness and headaches.   Hematological: Negative for adenopathy. Does not bruise/bleed easily.   Psychiatric/Behavioral: Negative.        Objective:     Physical Exam   Constitutional: She is oriented to person, place, and time. She appears well-developed and well-nourished.   HENT:   Head: Normocephalic and atraumatic.   Eyes: EOM are normal. Pupils are equal, round, and reactive to light. No scleral icterus.   Neck: Normal range of motion. Neck supple.   Cardiovascular: Normal rate, regular rhythm and normal heart sounds.   No murmur heard.  Pulmonary/Chest: Effort normal and breath sounds normal. She has no wheezes. She has no rales.   Abdominal: Soft. Bowel sounds are normal. She exhibits no distension and no mass. There is no tenderness. There is no rebound and no guarding.   Musculoskeletal: Normal range of motion. She exhibits no edema or tenderness.   Lymphadenopathy:     She has no cervical adenopathy.   Neurological: She is alert and oriented to person, place, and time.   Skin: Skin is warm and dry. Capillary refill takes less than 2 seconds. No rash noted. No erythema.   Psychiatric: She has a normal mood and affect. Her behavior  is normal.       Assessment/Plan:     1. S/P liver transplant    2. Ulcerative colitis without complications, unspecified location    3. CKD (chronic kidney disease) stage 3, GFR 30-59 ml/min    4. Immunosuppression    5. Essential hypertension    6. Neck pain      1. S/p liver transplant 2004, liver disease due to PSC    2. Immunosuppression - prograf 4/3, Cellcept 500 mg BID, last prograf level 4.7 10/16/18  --- immunosuppression stable, kidney function stable (although CKD 3)    3. Health maintenance  -- yearly dermatology visit, overdue, pt to schedule   -- DXA scan : overdue, pt to arrange with local PCP  -- Diabetes screening with A1c per PCP  -- Fibroscan yearly : will do today     4. CKD stage 3  -- followed by local nephrologist     5. HTN  -- elevated today, followed by newly established PCP    6. Planned neck surgery  -- will repeat labs, obtain fibroscan before clearance    7. Ulcerative colitis  -- follow with GI    PLAN:  1. RTC in 1 year, labs per protocol   2. Fibroscan today, then yearly  3. Overdue for dermatology f/u for annual skin assessment, pt will contact local derm provider to schedule f/u  4. Overdue to DEXA scan, pt aware of need to schedule and will arrange with local PCP for pt convenience  5. Discussed with pt no concerns from liver standpoint to proceed with surgery. However, will likely need full clearance from primary care provider and any other provider deemed necessary by ortho     ADDENDUM: Fibroscan results received.   Fibroscan today = kPA = 3.8 = F0-1 fibrosis.  CAP score = 237 = <S1    Discussed fibroscan results, including no significant steatosis or fibrosis. Will await results of labs.  Will likely need overall medical clearance from PCP but pt will discuss with ortho surgeon additional clearance needs    Labs stable from liver function standpoint, no contraindications from hepatology standpoint, will discuss with Dr. Mcgrath    Note routed to post-liver transplant  coordinator hermila, Dr. Ramila Thomas NP       UNOS Patient Status  Functional Status: 100% - Normal, no complaints, no evidence of disease  Physical Capacity: No Limitations

## 2018-11-19 NOTE — Clinical Note
Dr. Mcgrath,I saw Ms. Chetna Stock today for routine annual f/u. She is doing well. She likely will have neck surgery in the near future so requesting clearance. Fibroscan done with no steatosis or fibrosis. Repeated labs today, stable. Plts stable (130). Last prograf in past month at goal (4.7). No noted contraindications for neck surgery. Do you suggest any further testing before clearance from liver standpoint? ThanksNatalia

## 2018-11-19 NOTE — LETTER
November 19, 2018        JONATHAN Claudio  [address] [place] [address]  Phone: [phone]  Fax: [phone]     Dano Rivas  [address] [place] [address] [place] [address]             [facility] Liver Transplant  [address]  [facility] [address]  Phone: [phone]   Patient: Chetna Stock   MR Number: [id number]   YOB: 1950   Date of Visit: 11/19/2018       Dear Dr. JONATHAN Claudio, Dano Rivas    Thank you for referring Chetna Stock to me for evaluation. Attached you will find relevant portions of my assessment and plan of care.    If you have questions, please do not hesitate to call me. I look forward to following Chetna Stock along with you.    Sincerely,    Natalia Thomas, KIRILL    Enclosure    If you would like to receive this communication electronically, please contact [email or web address] or [phone] to request [facility] Information Link access.    [facility] - [facility] Reviews & Information Link is a tool which provides read-only access to select patient information with whom you have a relationship. Its easy to use and provides real time access to review your patients record including encounter summaries, notes, results, and demographic information.    If you feel you have received this communication in error or would no longer like to receive these types of communications, please e-mail [email or web address]

## 2018-11-19 NOTE — PROCEDURES
Fibroscan Procedure     Name: Chetna Stock  Date of Procedure : 2018   :: Natalia Thomas NP  Diagnosis: Transplant    Probe: M    Fibroscan reading: 3.8 KPa    Fibrosis:F 0-1     CAP readin dB/m    Steatosis: :<S1

## 2018-11-20 ENCOUNTER — TELEPHONE (OUTPATIENT)
Dept: TRANSPLANT | Facility: CLINIC | Age: 68
End: 2018-11-20

## 2018-11-20 NOTE — TELEPHONE ENCOUNTER
----- Message from Natalia Thomas NP sent at 11/19/2018  5:58 PM CST -----  Labs stable, no contraindications from a liver standpoint for surgery. Discussed with pt likely need full medical clearance from PCP but she should discuss that with ortho surgeon

## 2018-11-26 ENCOUNTER — TELEPHONE (OUTPATIENT)
Dept: TRANSPLANT | Facility: CLINIC | Age: 68
End: 2018-11-26

## 2018-11-26 NOTE — TELEPHONE ENCOUNTER
Patient reported she has upcoming apppointment with her orthopedic doctor and wanted to know if neck surgery was cleared.  Per recent clinic note: ok for neck surgery from liver transplant perspective but may need clearence from her PCP. Patient will review this with her orthopedic doctor, she said.

## 2018-11-26 NOTE — TELEPHONE ENCOUNTER
----- Message from Rodney Lennon sent at 11/26/2018 12:33 PM CST -----  Contact: Patient  Needs Advice    Reason for call: Pt would like to know if it will be okay for her to have the surgery on her neck        Communication Preference: [phone]     Additional Information: N/A

## 2018-12-28 ENCOUNTER — TELEPHONE (OUTPATIENT)
Dept: TRANSPLANT | Facility: CLINIC | Age: 68
End: 2018-12-28

## 2018-12-28 LAB
EXT ALBUMIN: 4.1
EXT ALKALINE PHOSPHATASE: 67
EXT ALT: 7
EXT AST: 13
EXT BILIRUBIN TOTAL: 0.4
EXT BUN: 32
EXT CALCIUM: 8.7
EXT CHLORIDE: 108
EXT CO2: 26
EXT CREATININE: 1.5 MG/DL
EXT GFR MDRD AF AMER: 42
EXT GGT: 11
EXT GLUCOSE: 108
EXT HEMATOCRIT: 34
EXT HEMOGLOBIN: 10.4
EXT LYMPH%: 23.6
EXT MONOCYTES%: 10.3
EXT PLATELETS: 109
EXT POTASSIUM: 3.5
EXT PROGRAF LEVEL: 3.7
EXT PROTEIN TOTAL: 6.2
EXT SEGS%: 66.1
EXT SODIUM: 142 MMOL/L
EXT WBC: 3.2

## 2018-12-28 NOTE — TELEPHONE ENCOUNTER
----- Message from Jeannine Clarke sent at 12/28/2018  3:28 PM CST -----  Contact: patient   Reason for call: Patient  States she would like to speak with coordinator         Communication Preference: [phone]    Additional Information: n/a

## 2019-01-02 DIAGNOSIS — Z94.4 S/P LIVER TRANSPLANT: ICD-10-CM

## 2019-01-02 RX ORDER — MYCOPHENOLATE MOFETIL 250 MG/1
500 CAPSULE ORAL 2 TIMES DAILY
Qty: 120 CAPSULE | Refills: 11 | Status: SHIPPED | OUTPATIENT
Start: 2019-01-02 | End: 2019-05-09 | Stop reason: SDUPTHER

## 2019-01-04 ENCOUNTER — TELEPHONE (OUTPATIENT)
Dept: TRANSPLANT | Facility: CLINIC | Age: 69
End: 2019-01-04

## 2019-01-07 ENCOUNTER — TELEPHONE (OUTPATIENT)
Dept: TRANSPLANT | Facility: CLINIC | Age: 69
End: 2019-01-07

## 2019-01-07 DIAGNOSIS — Z94.4 S/P LIVER TRANSPLANT: ICD-10-CM

## 2019-01-07 RX ORDER — TACROLIMUS 1 MG/1
CAPSULE ORAL
Qty: 210 CAPSULE | Refills: 11 | Status: SHIPPED | OUTPATIENT
Start: 2019-01-07 | End: 2019-05-09 | Stop reason: SDUPTHER

## 2019-01-07 NOTE — TELEPHONE ENCOUNTER
----- Message from Catina Mcgrath MD sent at 1/7/2019  9:45 AM CST -----  The Labs are stable - please let patient know.

## 2019-01-14 ENCOUNTER — TELEPHONE (OUTPATIENT)
Dept: TRANSPLANT | Facility: CLINIC | Age: 69
End: 2019-01-14

## 2019-01-14 NOTE — TELEPHONE ENCOUNTER
----- Message from Catalina Rojas sent at 1/14/2019 12:41 PM CST -----  Contact: [facility]armacy Calling    Reason for call: verify rx covered by Medicare     [facility]    Prescription Name: tacrolimus (PROGRAF) 1 MG Cap    Phone Number: [phone]    Additional Information: calling to verify if this rx is cov'd by medicare and the dates it is cov'd/pls contact [facility] w/this information

## 2019-01-14 NOTE — TELEPHONE ENCOUNTER
----- Message from Verito Cheng sent at 1/14/2019 10:39 AM CST -----  Contact: Patient  Needs Advice    Reason for call: pt stated Fatemeh needs to call pharmacist ([facility]) to request refill for prograf and cellcept. She stated she may need a PA for it. Patient is completely out of both        Communication Preference: [phone]    Additional Information: n/a

## 2019-04-01 LAB
EXT ALBUMIN: 4.1
EXT ALKALINE PHOSPHATASE: 74
EXT ALT: 7
EXT AST: 13
EXT BILIRUBIN TOTAL: 0.4
EXT BUN: 35
EXT CALCIUM: 8.6
EXT CHLORIDE: 107
EXT CO2: 25
EXT CREATININE: 1.7 MG/DL
EXT GFR MDRD AF AMER: 36
EXT GGT: 15
EXT GLUCOSE: 103
EXT HEMATOCRIT: 32.5
EXT HEMOGLOBIN: 10.1
EXT LYMPH%: 23.4
EXT MONOCYTES%: 16
EXT PLATELETS: 118
EXT POTASSIUM: 3.8
EXT PROTEIN TOTAL: 6.7
EXT SEGS%: 61
EXT SODIUM: 141 MMOL/L
EXT TACROLIMUS LVL: 3.4
EXT WBC: 2.7

## 2019-04-02 ENCOUNTER — TELEPHONE (OUTPATIENT)
Dept: TRANSPLANT | Facility: CLINIC | Age: 69
End: 2019-04-02

## 2019-04-02 NOTE — TELEPHONE ENCOUNTER
----- Message from Catina Mcgrath MD sent at 4/1/2019  4:56 PM CDT -----  The Labs are stable - please let patient know.

## 2019-05-09 ENCOUNTER — TELEPHONE (OUTPATIENT)
Dept: TRANSPLANT | Facility: CLINIC | Age: 69
End: 2019-05-09

## 2019-05-09 DIAGNOSIS — Z94.4 S/P LIVER TRANSPLANT: ICD-10-CM

## 2019-05-09 RX ORDER — TACROLIMUS 1 MG/1
CAPSULE ORAL
Qty: 210 CAPSULE | Refills: 11 | Status: SHIPPED | OUTPATIENT
Start: 2019-05-09 | End: 2019-10-11 | Stop reason: DRUGHIGH

## 2019-05-09 RX ORDER — OMEPRAZOLE 40 MG/1
40 CAPSULE, DELAYED RELEASE ORAL DAILY
Qty: 30 CAPSULE | Refills: 11 | Status: SHIPPED | OUTPATIENT
Start: 2019-05-09 | End: 2020-06-02 | Stop reason: SDUPTHER

## 2019-05-09 RX ORDER — MYCOPHENOLATE MOFETIL 250 MG/1
500 CAPSULE ORAL 2 TIMES DAILY
Qty: 120 CAPSULE | Refills: 11 | Status: SHIPPED | OUTPATIENT
Start: 2019-05-09 | End: 2020-05-21 | Stop reason: SDUPTHER

## 2019-05-09 NOTE — TELEPHONE ENCOUNTER
Patient gave name and number to call re: her transplant meds. : Jos roberson [facility]  [phone].

## 2019-05-09 NOTE — TELEPHONE ENCOUNTER
----- Message from Mandie Mackey sent at 5/9/2019  2:15 PM CDT -----  Contact: patient   Contact: Patient     Message: Patient have some questions about her medication     Communication Preference:     Additional Information: Patient states can she receive a call today if possible     Thanks!

## 2019-06-27 LAB
EXT ALBUMIN: 4.2
EXT ALKALINE PHOSPHATASE: 73
EXT ALT: 7
EXT AST: 13
EXT BILIRUBIN TOTAL: 0.4
EXT BUN: 35
EXT CALCIUM: 8.8
EXT CHLORIDE: 109
EXT CO2: 23
EXT CREATININE: 1.8 MG/DL
EXT GFR MDRD AF AMER: 37.6
EXT GLUCOSE: 117
EXT HEMATOCRIT: 28.2
EXT HEMOGLOBIN: 9.3
EXT LYMPH%: 23.8
EXT MONOCYTES%: 5.5
EXT PLATELETS: 115
EXT POTASSIUM: 4
EXT PROTEIN TOTAL: 6.5
EXT SEGS%: 70.7
EXT SODIUM: 141 MMOL/L
EXT TACROLIMUS LVL: 3.7
EXT WBC: 2.9

## 2019-07-01 ENCOUNTER — TELEPHONE (OUTPATIENT)
Dept: TRANSPLANT | Facility: CLINIC | Age: 69
End: 2019-07-01

## 2019-07-01 NOTE — TELEPHONE ENCOUNTER
----- Message from Catina Mcgrath MD sent at 7/1/2019  6:06 AM CDT -----  The Labs are stable - please let patient know.

## 2019-08-27 NOTE — TELEPHONE ENCOUNTER
Pt states she would like to speak with Fatemeh before she make an appt.   Reports left hip pain. No trauma/injury. States goes down left leg. Has been lifting her .

## 2019-10-03 ENCOUNTER — TELEPHONE (OUTPATIENT)
Dept: TRANSPLANT | Facility: CLINIC | Age: 69
End: 2019-10-03

## 2019-10-03 NOTE — TELEPHONE ENCOUNTER
----- Message from Cheyenne Fitzpatrick MA sent at 10/3/2019  1:50 PM CDT -----  Contact: Pt  Pt is calling to let Fatemeh know she had her blood work done today.    Pt# [phone]

## 2019-10-08 LAB
EXT ALBUMIN: 4.1
EXT ALKALINE PHOSPHATASE: 72
EXT ALT: 8
EXT AST: 13
EXT BILIRUBIN TOTAL: 0.3
EXT BUN: 40
EXT CALCIUM: 8.6
EXT CHLORIDE: 107
EXT CHOLESTEROL: 157
EXT CO2: 22
EXT CREATININE: 2 MG/DL
EXT GFR MDRD AF AMER: 30
EXT GGT: 10
EXT GLUCOSE: 115
EXT HDL: 36
EXT HEMATOCRIT: 30.6
EXT HEMOGLOBIN: 9.8
EXT LDL CHOLESTEROL: 93
EXT LYMPH%: 27.8
EXT MONOCYTES%: 15.3
EXT PLATELETS: 107
EXT POTASSIUM: 4.2
EXT PROTEIN TOTAL: 6.5
EXT SEGS%: 56.9
EXT SODIUM: 140 MMOL/L
EXT TACROLIMUS LVL: 7.4
EXT TRIGLYCERIDES: 183
EXT WBC: 4

## 2019-10-11 ENCOUNTER — TELEPHONE (OUTPATIENT)
Dept: TRANSPLANT | Facility: CLINIC | Age: 69
End: 2019-10-11

## 2019-10-11 DIAGNOSIS — Z94.4 S/P LIVER TRANSPLANT: ICD-10-CM

## 2019-10-11 RX ORDER — TACROLIMUS 1 MG/1
2 CAPSULE ORAL EVERY 12 HOURS
Qty: 210 CAPSULE | Refills: 11 | Status: SHIPPED | OUTPATIENT
Start: 2019-10-11 | End: 2020-05-12 | Stop reason: DRUGHIGH

## 2019-10-11 NOTE — TELEPHONE ENCOUNTER
----- Message from Catina Mcgrath MD sent at 10/9/2019  3:28 PM CDT -----  Decrease prograf to 2/2 from 3/2 and repeat labs in 2 weeks - please let patient know.

## 2019-10-11 NOTE — TELEPHONE ENCOUNTER
Patient notified and instructed to reduce the Prograf dose to 2mg twice daily, repeat labs in 2 weeks(10/21/19) and to f/u with her local nephrologist.  She was able to repeat instructions and voiced understanding.

## 2019-10-28 ENCOUNTER — TELEPHONE (OUTPATIENT)
Dept: TRANSPLANT | Facility: CLINIC | Age: 69
End: 2019-10-28

## 2019-10-28 LAB
EXT ALBUMIN: 4
EXT ALKALINE PHOSPHATASE: 75
EXT ALT: 7
EXT AST: 13
EXT BILIRUBIN TOTAL: 0.4
EXT BUN: 32
EXT CALCIUM: 8.7
EXT CHLORIDE: 106
EXT CO2: 23
EXT CREATININE: 1.8 MG/DL
EXT GFR MDRD AF AMER: 34
EXT GGT: 21
EXT GLUCOSE: 115
EXT HEMATOCRIT: 32.3
EXT HEMOGLOBIN: 9.8
EXT PLATELETS: 151
EXT POTASSIUM: 3.8
EXT PROTEIN TOTAL: 6.5
EXT SODIUM: 140 MMOL/L
EXT TACROLIMUS LVL: 4.8
EXT WBC: 3.8

## 2019-10-28 NOTE — TELEPHONE ENCOUNTER
----- Message from Catina Mcgrath MD sent at 10/28/2019  4:53 PM CDT -----  The Labs are stable - please let patient know.

## 2020-01-23 LAB
EXT ALBUMIN: 4.1
EXT ALKALINE PHOSPHATASE: 87
EXT ALT: 7
EXT AST: 14
EXT BILIRUBIN TOTAL: 0.3
EXT BUN: 26
EXT CALCIUM: 8.6
EXT CHLORIDE: 107
EXT CO2: 26
EXT CREATININE: 1.5 MG/DL
EXT GFR MDRD AF AMER: 41
EXT GGT: 19
EXT GLUCOSE: 97
EXT HEMATOCRIT: 34
EXT HEMOGLOBIN: 10.4
EXT LYMPH%: 17.7
EXT MONOCYTES%: 7
EXT PLATELETS: 177
EXT POTASSIUM: 4.1
EXT PROTEIN TOTAL: 6.7
EXT SEGS%: 75.3
EXT SODIUM: 142 MMOL/L
EXT TACROLIMUS LVL: 4.9
EXT WBC: 4.9

## 2020-01-28 ENCOUNTER — TELEPHONE (OUTPATIENT)
Dept: TRANSPLANT | Facility: CLINIC | Age: 70
End: 2020-01-28

## 2020-01-28 NOTE — TELEPHONE ENCOUNTER
----- Message from Catina Mcgrath MD sent at 1/28/2020 11:31 AM CST -----  The Labs are stable - please let patient know.

## 2020-05-05 ENCOUNTER — TELEPHONE (OUTPATIENT)
Dept: TRANSPLANT | Facility: CLINIC | Age: 70
End: 2020-05-05

## 2020-05-05 NOTE — TELEPHONE ENCOUNTER
----- Message from Stacey Aquino sent at 5/5/2020 10:15 AM CDT -----  Pt calling to speak to coordinator regarding labs    Pt contact [phone]

## 2020-05-08 ENCOUNTER — TELEPHONE (OUTPATIENT)
Dept: TRANSPLANT | Facility: CLINIC | Age: 70
End: 2020-05-08

## 2020-05-08 NOTE — TELEPHONE ENCOUNTER
----- Message from Bubba Bowers sent at 5/8/2020  3:33 PM CDT -----  Contact: pt  Pt was returning coordinator phone call    Call back: [phone]

## 2020-05-08 NOTE — TELEPHONE ENCOUNTER
Left message for patient returning her call, informed her that I have not received her lab results.

## 2020-05-08 NOTE — TELEPHONE ENCOUNTER
----- Message from Bubba Bowers sent at 5/8/2020 12:56 PM CDT -----  Contact: pt  Calling to speak with coordinator pt wants to know was her blood work received     Call back: [phone]

## 2020-05-11 LAB
EXT ALBUMIN: 4.2
EXT ALKALINE PHOSPHATASE: 87
EXT ALT: 7
EXT AST: 13
EXT BILIRUBIN TOTAL: 0.4
EXT BUN: 41
EXT CALCIUM: 8.6
EXT CHLORIDE: 104
EXT CO2: 25
EXT CREATININE: 1.8 MG/DL
EXT GFR MDRD AF AMER: 35
EXT GGT: 19
EXT GLUCOSE: 110
EXT HEMATOCRIT: 32
EXT HEMOGLOBIN: 9.9
EXT LYMPH%: 22.6
EXT MONOCYTES%: 7.2
EXT PLATELETS: 135
EXT POTASSIUM: 4
EXT PROTEIN TOTAL: 6.7
EXT SEGS%: 70.2
EXT SODIUM: 138 MMOL/L
EXT TACROLIMUS LVL: 2.2
EXT WBC: 3.5

## 2020-05-12 DIAGNOSIS — Z94.4 S/P LIVER TRANSPLANT: ICD-10-CM

## 2020-05-12 RX ORDER — TACROLIMUS 1 MG/1
CAPSULE ORAL
Qty: 300 CAPSULE | Refills: 11 | Status: SHIPPED | OUTPATIENT
Start: 2020-05-12 | End: 2021-04-23 | Stop reason: DRUGHIGH

## 2020-05-12 NOTE — TELEPHONE ENCOUNTER
----- Message from Yareli Osborne MD sent at 5/11/2020  7:49 PM CDT -----  Prograf level too low.  Pl check compliance with prograf dosing.  Repeat labs next week if break in compliance. If compliant, increase dose to 3/2 (from 2/2) and repeat labs in a week.

## 2020-05-12 NOTE — TELEPHONE ENCOUNTER
Patient notified and instructed to increase Prograf dose to 3 in the AM and 2 in the PM, and repeat labs next week. She was able to voice understanding.

## 2020-05-21 DIAGNOSIS — Z94.4 S/P LIVER TRANSPLANT: ICD-10-CM

## 2020-05-21 RX ORDER — MYCOPHENOLATE MOFETIL 250 MG/1
500 CAPSULE ORAL 2 TIMES DAILY
Qty: 120 CAPSULE | Refills: 11 | Status: SHIPPED | OUTPATIENT
Start: 2020-05-21 | End: 2021-05-05 | Stop reason: SDUPTHER

## 2020-06-02 RX ORDER — OMEPRAZOLE 40 MG/1
40 CAPSULE, DELAYED RELEASE ORAL DAILY
Qty: 30 CAPSULE | Refills: 11 | Status: SHIPPED | OUTPATIENT
Start: 2020-06-02 | End: 2021-06-03 | Stop reason: SDUPTHER

## 2020-06-09 ENCOUNTER — TELEPHONE (OUTPATIENT)
Dept: TRANSPLANT | Facility: CLINIC | Age: 70
End: 2020-06-09

## 2020-06-19 ENCOUNTER — TELEPHONE (OUTPATIENT)
Dept: TRANSPLANT | Facility: CLINIC | Age: 70
End: 2020-06-19

## 2020-06-24 LAB
CRP SERPL-MCNC: 0.6 MG/DL
ERYTHROCYTE SEDIMENTATION RATE: 33
EXT ALBUMIN: 4.1
EXT ALKALINE PHOSPHATASE: 80
EXT ALT: 7
EXT AST: 15
EXT BILIRUBIN TOTAL: 0.3
EXT BUN: 34
EXT CALCIUM: 8.4
EXT CHLORIDE: 107
EXT CO2: 24
EXT CREATININE: 1.8 MG/DL
EXT GFR MDRD AF AMER: 33.2
EXT GGT: 16
EXT GLUCOSE: 94
EXT HEMATOCRIT: 29.6
EXT HEMOGLOBIN: 9.2
EXT LYMPH%: 36.2
EXT MONOCYTES%: 12.2
EXT PLATELETS: 139
EXT POTASSIUM: 4.1
EXT PROTEIN TOTAL: 6.4
EXT SEGS%: 51.6
EXT SODIUM: 142 MMOL/L
EXT TACROLIMUS LVL: 3.1
EXT WBC: 2.7

## 2020-06-25 ENCOUNTER — TELEPHONE (OUTPATIENT)
Dept: TRANSPLANT | Facility: CLINIC | Age: 70
End: 2020-06-25

## 2020-06-25 NOTE — TELEPHONE ENCOUNTER
----- Message from Yareli Osborne MD sent at 6/24/2020  5:03 PM CDT -----  Please inform patient results are OK. Continue routine labs.

## 2020-09-04 ENCOUNTER — TELEPHONE (OUTPATIENT)
Dept: TRANSPLANT | Facility: CLINIC | Age: 70
End: 2020-09-04

## 2020-09-04 NOTE — TELEPHONE ENCOUNTER
Labs due 8/24/20; requested results from local lab, no results received. Missed Lab Letter sent to patient.

## 2020-09-15 ENCOUNTER — TELEPHONE (OUTPATIENT)
Dept: TRANSPLANT | Facility: CLINIC | Age: 70
End: 2020-09-15

## 2020-09-15 NOTE — TELEPHONE ENCOUNTER
----- Message from Lana Khan sent at 9/15/2020  1:24 PM CDT -----  Regarding: Labs  Contact: Yue Barbosa is calling to let nurse know she did labs on today.      [phone]

## 2020-11-09 ENCOUNTER — TELEPHONE (OUTPATIENT)
Dept: TRANSPLANT | Facility: CLINIC | Age: 70
End: 2020-11-09

## 2020-11-19 ENCOUNTER — TELEPHONE (OUTPATIENT)
Dept: TRANSPLANT | Facility: CLINIC | Age: 70
End: 2020-11-19

## 2020-11-19 NOTE — TELEPHONE ENCOUNTER
Message received from patient:     blood work should be faxed today. Pt had blood work done last month, but since [facility] has not received labs, [facility] will fax results.

## 2020-11-19 NOTE — TELEPHONE ENCOUNTER
----- Message from Justen Godoy sent at 11/19/2020 10:19 AM CST -----  Contact: Pt  Pt states blood work should be faxed today. Pt had blood work done last month, but since [facility] has not received labs, [facility] will fax results.    Pt [phone]

## 2020-11-20 LAB
EXT ALBUMIN: 4.3
EXT ALKALINE PHOSPHATASE: 87
EXT ALT: 5
EXT AST: 10
EXT BILIRUBIN TOTAL: 0.4
EXT BUN: 47
EXT CALCIUM: 8.7
EXT CHLORIDE: 106
EXT CO2: 25
EXT CREATININE: 1.9 MG/DL
EXT GFR MDRD AF AMER: 32
EXT GGT: 13
EXT GLUCOSE: 116
EXT HEMATOCRIT: 31.6
EXT HEMOGLOBIN: 10
EXT LYMPH%: 27.6
EXT MONOCYTES%: 11.9
EXT PLATELETS: 136
EXT POTASSIUM: 3.9
EXT PROTEIN TOTAL: 6.6
EXT SEGS%: 60.5
EXT SODIUM: 140 MMOL/L
EXT TACROLIMUS LVL: 2.3
EXT WBC: 2.9

## 2020-11-23 ENCOUNTER — TELEPHONE (OUTPATIENT)
Dept: TRANSPLANT | Facility: CLINIC | Age: 70
End: 2020-11-23

## 2020-11-23 NOTE — TELEPHONE ENCOUNTER
----- Message from Catina Mcgrath MD sent at 11/20/2020 12:58 PM CST -----  The Labs are stable - please let patient know.

## 2020-11-30 LAB
EXT ALBUMIN: 4.3
EXT BACTERIA UA: ABNORMAL
EXT BUN: 31
EXT CALCIUM: 8.7
EXT CHLORIDE: 103
EXT CO2: 26
EXT CREATININE: 1.4 MG/DL
EXT EOSINOPHIL%: 2.4
EXT GFR MDRD AF AMER: 44
EXT GLUCOSE: 126
EXT HEMATOCRIT: 33.4
EXT HEMOGLOBIN: 10.2
EXT LYMPH%: 20.4
EXT MONOCYTES%: 7.1
EXT PHOSPHORUS: 3.3
EXT PLATELETS: 135
EXT POTASSIUM: 3.4
EXT PROTEIN UA: ABNORMAL
EXT PTH, INTACT: 129
EXT RBC UA: ABNORMAL
EXT SEGS%: 69.5
EXT SODIUM: 140 MMOL/L
EXT TACROLIMUS LVL: 3.5
EXT URINE CULTURE: NO GROWTH
EXT VIT D 25 HYDROXY: 28
EXT WBC UA: ABNORMAL
EXT WBC: 3.4

## 2020-12-01 ENCOUNTER — TELEPHONE (OUTPATIENT)
Dept: TRANSPLANT | Facility: CLINIC | Age: 70
End: 2020-12-01

## 2021-01-15 ENCOUNTER — TELEPHONE (OUTPATIENT)
Dept: TRANSPLANT | Facility: CLINIC | Age: 71
End: 2021-01-15

## 2021-02-22 ENCOUNTER — TELEPHONE (OUTPATIENT)
Dept: TRANSPLANT | Facility: CLINIC | Age: 71
End: 2021-02-22

## 2021-03-24 ENCOUNTER — TELEPHONE (OUTPATIENT)
Dept: TRANSPLANT | Facility: CLINIC | Age: 71
End: 2021-03-24

## 2021-03-30 ENCOUNTER — TELEPHONE (OUTPATIENT)
Dept: TRANSPLANT | Facility: CLINIC | Age: 71
End: 2021-03-30

## 2021-04-19 ENCOUNTER — TELEPHONE (OUTPATIENT)
Dept: TRANSPLANT | Facility: CLINIC | Age: 71
End: 2021-04-19

## 2021-04-20 ENCOUNTER — TELEPHONE (OUTPATIENT)
Dept: TRANSPLANT | Facility: CLINIC | Age: 71
End: 2021-04-20

## 2021-04-20 LAB
EXT ALBUMIN: 4.1
EXT ALBUMIN: 4.1
EXT ALKALINE PHOSPHATASE: 84
EXT ALKALINE PHOSPHATASE: 88
EXT ALT: 11
EXT ALT: 7
EXT AST: 11
EXT AST: 17
EXT BILIRUBIN TOTAL: 0.4
EXT BILIRUBIN TOTAL: 0.4
EXT BUN: 35
EXT BUN: 42
EXT CALCIUM: 8.7
EXT CALCIUM: 9
EXT CHLORIDE: 105
EXT CHLORIDE: 105
EXT CO2: 27
EXT CO2: 28
EXT CREATININE: 1.6 MG/DL
EXT CREATININE: 1.9 MG/DL
EXT GFR MDRD AF AMER: 33
EXT GFR MDRD AF AMER: 39.6
EXT GGT: 15
EXT GGT: 34
EXT GLUCOSE: 92
EXT GLUCOSE: 93
EXT HEMATOCRIT: 30.2
EXT HEMATOCRIT: 32.3
EXT HEMOGLOBIN: 10
EXT HEMOGLOBIN: 9.7
EXT LYMPH%: 20.5
EXT LYMPH%: 23.9
EXT MONOCYTES%: 6.1
EXT MONOCYTES%: 8.7
EXT PLATELETS: 138
EXT PLATELETS: 158
EXT POTASSIUM: 4.1
EXT POTASSIUM: 4.2
EXT PROTEIN TOTAL: 6.6
EXT PROTEIN TOTAL: 6.7
EXT SEGS%: 70
EXT SEGS%: 70.8
EXT SODIUM: 141 MMOL/L
EXT SODIUM: 141 MMOL/L
EXT TACROLIMUS LVL: 1.8
EXT TACROLIMUS LVL: 8.8
EXT WBC: 3.2
EXT WBC: 3.8

## 2021-04-23 DIAGNOSIS — Z94.4 S/P LIVER TRANSPLANT: ICD-10-CM

## 2021-04-25 RX ORDER — TACROLIMUS 1 MG/1
2 CAPSULE ORAL EVERY 12 HOURS
Qty: 120 CAPSULE | Refills: 11 | Status: SHIPPED | OUTPATIENT
Start: 2021-04-25 | End: 2021-05-28 | Stop reason: SDUPTHER

## 2021-05-05 ENCOUNTER — TELEPHONE (OUTPATIENT)
Dept: TRANSPLANT | Facility: CLINIC | Age: 71
End: 2021-05-05

## 2021-05-05 DIAGNOSIS — Z94.4 S/P LIVER TRANSPLANT: ICD-10-CM

## 2021-05-05 RX ORDER — MYCOPHENOLATE MOFETIL 250 MG/1
500 CAPSULE ORAL 2 TIMES DAILY
Qty: 120 CAPSULE | Refills: 11 | Status: SHIPPED | OUTPATIENT
Start: 2021-05-05 | End: 2022-05-09

## 2021-05-21 NOTE — TELEPHONE ENCOUNTER
----- Message from Catina Mcgrath MD sent at 11/30/2020  3:32 PM CST -----  What about liver labs?   mary

## 2021-05-28 DIAGNOSIS — Z94.4 S/P LIVER TRANSPLANT: ICD-10-CM

## 2021-05-28 RX ORDER — TACROLIMUS 1 MG/1
2 CAPSULE ORAL EVERY 12 HOURS
Qty: 120 CAPSULE | Refills: 11 | Status: SHIPPED | OUTPATIENT
Start: 2021-05-28 | End: 2022-07-11 | Stop reason: SDUPTHER

## 2021-06-03 RX ORDER — OMEPRAZOLE 40 MG/1
40 CAPSULE, DELAYED RELEASE ORAL DAILY
Qty: 30 CAPSULE | Refills: 11 | Status: SHIPPED | OUTPATIENT
Start: 2021-06-03 | End: 2022-05-17

## 2021-07-22 ENCOUNTER — TELEPHONE (OUTPATIENT)
Dept: TRANSPLANT | Facility: CLINIC | Age: 71
End: 2021-07-22

## 2021-08-02 ENCOUNTER — TELEPHONE (OUTPATIENT)
Dept: TRANSPLANT | Facility: CLINIC | Age: 71
End: 2021-08-02

## 2021-08-12 ENCOUNTER — TELEPHONE (OUTPATIENT)
Dept: TRANSPLANT | Facility: CLINIC | Age: 71
End: 2021-08-12

## 2021-08-27 LAB
EXT ALBUMIN: 4.1
EXT ALKALINE PHOSPHATASE: 82
EXT ALT: 7
EXT AST: 13
EXT BILIRUBIN TOTAL: 0.3
EXT BUN: 40
EXT CALCIUM: 9.2
EXT CHLORIDE: 108
EXT CO2: 26
EXT CREATININE: 1.7 MG/DL
EXT GFR MDRD AF AMER: 36.3
EXT GGT: 15
EXT GLUCOSE: 128
EXT HEMATOCRIT: 31.9
EXT HEMOGLOBIN: 9.9
EXT LYMPH%: 19.1
EXT MONOCYTES%: 11.4
EXT PLATELETS: 161
EXT POTASSIUM: 5.2
EXT PROTEIN TOTAL: 6.2
EXT SEGS%: 69.5
EXT SODIUM: 142 MMOL/L
EXT TACROLIMUS LVL: 2.5
EXT WBC: 3.9

## 2021-09-07 ENCOUNTER — TELEPHONE (OUTPATIENT)
Dept: TRANSPLANT | Facility: CLINIC | Age: 71
End: 2021-09-07

## 2021-11-19 LAB
EXT ALBUMIN: 4.1
EXT ALKALINE PHOSPHATASE: 89
EXT ALT: 6
EXT AST: 13
EXT BILIRUBIN TOTAL: 0.3
EXT BUN: 26
EXT CALCIUM: 9
EXT CHLORIDE: 108
EXT CO2: 26
EXT CREATININE: 1.4 MG/DL
EXT GFR MDRD AF AMER: 44.1
EXT GGT: 13
EXT GLUCOSE: 116
EXT HEMATOCRIT: 32.3
EXT HEMOGLOBIN: 10.2
EXT LYMPH%: 20.5
EXT MONOCYTES%: 7.6
EXT PLATELETS: 147
EXT POTASSIUM: 4.2
EXT PROTEIN TOTAL: 6.8
EXT SEGS%: 71.9
EXT SODIUM: 144 MMOL/L
EXT TACROLIMUS LVL: 1.9
EXT WBC: 3.6

## 2021-12-13 ENCOUNTER — TELEPHONE (OUTPATIENT)
Dept: TRANSPLANT | Facility: CLINIC | Age: 71
End: 2021-12-13
Payer: COMMERCIAL

## 2022-02-24 ENCOUNTER — TELEPHONE (OUTPATIENT)
Dept: TRANSPLANT | Facility: CLINIC | Age: 72
End: 2022-02-24
Payer: COMMERCIAL

## 2022-02-24 NOTE — TELEPHONE ENCOUNTER
----- Message from Sherif Mackey sent at 2/24/2022  1:43 PM CST -----  Regarding: Patient status  Patient calling to let staff know that she did do the blood work today.    Call: [phone] (Mobile

## 2022-02-25 LAB
CHOLESTEROL, TOTAL: 201
EXT ALBUMIN: 4
EXT ALKALINE PHOSPHATASE: 83
EXT ALT: 6
EXT AST: 11
EXT BILIRUBIN TOTAL: 0.3
EXT BUN: 34
EXT CALCIUM: 8.7
EXT CHLORIDE: 106
EXT CO2: 25
EXT CREATININE: 1.6 MG/DL
EXT GFR MDRD AF AMER: 39
EXT GGT: 12
EXT GLUCOSE: 103
EXT HEMATOCRIT: 30.2
EXT HEMOGLOBIN: 9.8
EXT LYMPH%: 23.3
EXT MONOCYTES%: 9.7
EXT PLATELETS: 145
EXT POTASSIUM: 4.1
EXT PROTEIN TOTAL: 6.3
EXT SEGS%: 67
EXT SODIUM: 139 MMOL/L
EXT TACROLIMUS LVL: 2.2
EXT WBC: 3.8
HDLC SERPL-MCNC: 35 MG/DL
LDLC SERPL CALC-MCNC: 124 MG/DL
TRIGL SERPL-MCNC: 271 MG/DL

## 2022-02-28 ENCOUNTER — TELEPHONE (OUTPATIENT)
Dept: TRANSPLANT | Facility: CLINIC | Age: 72
End: 2022-02-28
Payer: COMMERCIAL

## 2022-02-28 NOTE — TELEPHONE ENCOUNTER
Patient notified and instructed: labs received and sent to MD for review. She was able to voice understanding.

## 2022-02-28 NOTE — TELEPHONE ENCOUNTER
----- Message from Jonah Pineda sent at 2/28/2022  8:22 AM CST -----  Regarding: lab results  Pt call to speak with Fatemeh in regards to lab results please give me a call    Call [phone]

## 2022-03-04 ENCOUNTER — TELEPHONE (OUTPATIENT)
Dept: TRANSPLANT | Facility: CLINIC | Age: 72
End: 2022-03-04
Payer: COMMERCIAL

## 2022-03-04 NOTE — TELEPHONE ENCOUNTER
Patient was notified: Lab Letter was sent; reminded patient she is past due for her annual clinic visit, and an appointment will be sent to her.

## 2022-03-04 NOTE — TELEPHONE ENCOUNTER
----- Message from Catina Mcgrath MD sent at 3/2/2022  7:52 PM CST -----  The Labs are stable - please let patient know.needs f/u with me. I have never seen her

## 2022-05-31 LAB
EXT ALBUMIN: 4.1
EXT ALKALINE PHOSPHATASE: 87
EXT ALT: 6
EXT AST: 10
EXT BILIRUBIN TOTAL: 0.3
EXT BUN: 35
EXT CALCIUM: 8.8
EXT CHLORIDE: 110
EXT CHOLESTEROL: 190
EXT CO2: 23
EXT CREATININE: 1.7 MG/DL
EXT GFR MDRD AF AMER: 35
EXT GLUCOSE: 112
EXT HDL: 38
EXT HEMATOCRIT: 31.6
EXT HEMOGLOBIN: 10.1
EXT LDL CHOLESTEROL: 129
EXT LYMPH%: 20
EXT MONOCYTES%: 10.2
EXT PLATELETS: 133
EXT POTASSIUM: 3.9
EXT PROTEIN TOTAL: 6.6
EXT SEGS%: 69.8
EXT SODIUM: 143 MMOL/L
EXT TACROLIMUS LVL: 2.3
EXT TRIGLYCERIDES: 123
EXT WBC: 3.9

## 2022-06-08 ENCOUNTER — TELEPHONE (OUTPATIENT)
Dept: TRANSPLANT | Facility: CLINIC | Age: 72
End: 2022-06-08
Payer: COMMERCIAL

## 2022-06-08 NOTE — TELEPHONE ENCOUNTER
----- Message from Catina Mcgarth MD sent at 6/5/2022  1:16 PM CDT -----  The Labs are stable - please let patient know.

## 2022-07-01 ENCOUNTER — TELEPHONE (OUTPATIENT)
Dept: TRANSPLANT | Facility: CLINIC | Age: 72
End: 2022-07-01
Payer: COMMERCIAL

## 2022-07-01 NOTE — TELEPHONE ENCOUNTER
----- Message from Lana Khan sent at 7/1/2022  3:42 PM CDT -----  Regarding: Speak with office  Contact: Chetna  Calling to speak with office to change appt to a Monday. Pt need appt as early as possible.    [phone]

## 2022-07-20 ENCOUNTER — TELEPHONE (OUTPATIENT)
Dept: TRANSPLANT | Facility: CLINIC | Age: 72
End: 2022-07-20
Payer: MEDICARE

## 2022-07-25 ENCOUNTER — OFFICE VISIT (OUTPATIENT)
Dept: TRANSPLANT | Facility: CLINIC | Age: 72
End: 2022-07-25
Payer: MEDICARE

## 2022-07-25 VITALS
DIASTOLIC BLOOD PRESSURE: 78 MMHG | WEIGHT: 178.38 LBS | OXYGEN SATURATION: 100 % | RESPIRATION RATE: 18 BRPM | HEIGHT: 65 IN | SYSTOLIC BLOOD PRESSURE: 127 MMHG | BODY MASS INDEX: 29.72 KG/M2 | HEART RATE: 82 BPM

## 2022-07-25 DIAGNOSIS — I10 ESSENTIAL HYPERTENSION: ICD-10-CM

## 2022-07-25 DIAGNOSIS — N18.30 STAGE 3 CHRONIC KIDNEY DISEASE, UNSPECIFIED WHETHER STAGE 3A OR 3B CKD: ICD-10-CM

## 2022-07-25 DIAGNOSIS — Z94.4 S/P LIVER TRANSPLANT: ICD-10-CM

## 2022-07-25 DIAGNOSIS — D84.9 IMMUNOSUPPRESSION: Primary | ICD-10-CM

## 2022-07-25 DIAGNOSIS — Z78.0 POST-MENOPAUSAL: ICD-10-CM

## 2022-07-25 PROCEDURE — 99215 OFFICE O/P EST HI 40 MIN: CPT | Mod: PBBFAC,PN | Performed by: INTERNAL MEDICINE

## 2022-07-25 PROCEDURE — 99999 PR PBB SHADOW E&M-EST. PATIENT-LVL V: ICD-10-PCS | Mod: PBBFAC,,, | Performed by: INTERNAL MEDICINE

## 2022-07-25 PROCEDURE — 99214 PR OFFICE/OUTPT VISIT, EST, LEVL IV, 30-39 MIN: ICD-10-PCS | Mod: S$PBB,,, | Performed by: INTERNAL MEDICINE

## 2022-07-25 PROCEDURE — 99999 PR PBB SHADOW E&M-EST. PATIENT-LVL V: CPT | Mod: PBBFAC,,, | Performed by: INTERNAL MEDICINE

## 2022-07-25 PROCEDURE — 99214 OFFICE O/P EST MOD 30 MIN: CPT | Mod: S$PBB,,, | Performed by: INTERNAL MEDICINE

## 2022-07-25 RX ORDER — SERTRALINE HYDROCHLORIDE 100 MG/1
TABLET, FILM COATED ORAL
COMMUNITY

## 2022-07-25 RX ORDER — AMOXICILLIN 500 MG/1
1000 CAPSULE ORAL 2 TIMES DAILY
COMMUNITY
Start: 2022-07-06

## 2022-07-25 RX ORDER — PENICILLIN V POTASSIUM 500 MG/1
TABLET, FILM COATED ORAL
COMMUNITY
Start: 2022-07-06

## 2022-07-25 RX ORDER — ACETAMINOPHEN AND CODEINE PHOSPHATE 300; 30 MG/1; MG/1
TABLET ORAL
COMMUNITY
Start: 2022-07-06

## 2022-07-25 RX ORDER — HYDROCODONE BITARTRATE AND ACETAMINOPHEN 7.5; 325 MG/1; MG/1
1 TABLET ORAL EVERY 6 HOURS PRN
COMMUNITY

## 2022-07-25 RX ORDER — IBUPROFEN 800 MG/1
800 TABLET ORAL EVERY 6 HOURS PRN
COMMUNITY
Start: 2022-03-21

## 2022-07-25 RX ORDER — ERGOCALCIFEROL 1.25 MG/1
CAPSULE ORAL
COMMUNITY
Start: 2022-07-13

## 2022-07-25 RX ORDER — FAMOTIDINE 20 MG/1
20 TABLET, FILM COATED ORAL NIGHTLY PRN
COMMUNITY
Start: 2022-06-08

## 2022-07-25 RX ORDER — LATANOPROST 50 UG/ML
SOLUTION/ DROPS OPHTHALMIC
COMMUNITY
Start: 2022-04-05

## 2022-07-25 NOTE — PATIENT INSTRUCTIONS
Labs every 3 months and no change in prograf or cellcept  Continue to see the kidney doctor  Bone density now  Dermatology now  Mammogram 2023  Colonoscopy per local GI  Monitor HTN  Return 1 year

## 2022-07-25 NOTE — PROGRESS NOTES
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    ORGAN: LIVER  Whole or Partial: whole liver  Donor Type:   CDC High Risk:   Donor CMV Status: Positive  Donor HCV Status: Negative  Donor HBcAb: Negative  Donor HBV DINAH:   Donor HCV DINAH:   Biliary Anastomosis:   Arterial Anatomy:   IVC reconstruction:   Portal vein status:   .    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 Currently, she is doing well. Current complaints include none. Has a hx of kidney cancer and is s/p nephrectomy.    Allograft function 5/24/22: Tbil 0.3, ALT 6, AST 10, ALKP 87, creat 1.7, prograf 2.3  IS: Pg, cellcept 500/500    Health Maintenance  Dermatology: has not seen one for many years  Bone density: not done in many years  Colonoscopy: 2022- no polyps-repeat recommended in 2 years (family hx colon cancer)  Mammogram: up to date for 2022- no breast cancer  Nephrology: sees nephrologist regularly  Pap- had hystercetomby      Review of Systems   Constitutional: Negative.    HENT: Negative.    Eyes: Negative.    Respiratory: Negative.    Cardiovascular: Negative.    Gastrointestinal: Negative.    Genitourinary: Negative.    Musculoskeletal: Negative.    Skin: Negative.    Neurological: Negative.    Psychiatric/Behavioral: Negative.        Objective:     Vitals:    07/25/22 1502   BP: 127/78   Pulse: 82   Resp: 18     Physical Exam  Vitals reviewed.   Constitutional:       Appearance: She is well-developed.   HENT:      Head: Normocephalic and atraumatic.   Eyes:      General: No scleral icterus.     Conjunctiva/sclera: Conjunctivae normal.      Pupils: Pupils are equal, round, and reactive to light.   Neck:      Thyroid: No thyromegaly.   Cardiovascular:      Rate and Rhythm: Normal rate and regular rhythm.      Heart sounds:  Normal heart sounds.   Pulmonary:      Effort: Pulmonary effort is normal.      Breath sounds: Normal breath sounds. No rales.   Abdominal:      General: Bowel sounds are normal. There is no distension.      Palpations: Abdomen is soft. There is no mass.      Tenderness: There is no abdominal tenderness.   Musculoskeletal:         General: Normal range of motion.      Cervical back: Normal range of motion and neck supple.   Skin:     General: Skin is warm and dry.      Findings: No rash.   Neurological:      Mental Status: She is alert and oriented to person, place, and time.       Lab Results   Component Value Date    BILITOT 0.4 11/19/2018    AST 14 11/19/2018    ALT 8 (L) 11/19/2018    ALKPHOS 75 11/19/2018    CREATININE 1.6 (H) 11/19/2018    ALBUMIN 3.8 11/19/2018     Lab Results   Component Value Date    WBC 3.24 (L) 11/19/2018    HGB 10.4 (L) 11/19/2018    HCT 35.5 (L) 11/19/2018     (L) 11/19/2018     Lab Results   Component Value Date    TACROLIMUS 5.4 06/07/2004       Assessment/Plan:   The patient is now 18 years post liver transplant for PSC. Current recommendations:  1. Allograft function and control of IS: continue prograf with target trough 2-3 due to renal insufficiency and continue cellcept 500 mg bid; labs every 3 months indefinitely  2. Renal insufficiency with only one kidney: minimize prograf and nephrology f/u  3.  Health maintenance: the patient should see a dermatologist annually to screen for skin cancer, perform regular colonoscopies and mammograms  4. R/O osteoporosis. I am recommending a bone density to r/o osteoporosis.  5. HTN: monitor closely and continue current meds    Patient advised that it is recommended that all transplant candidates, and their close contacts and household members receive Covid vaccination.    Return 1  year    Catina Mcgrath MD           [facility] Patient Status  Functional Status: 100% - Normal, no complaints, no evidence of disease  Physical Capacity: No  Limitations  Working for income: no  New diabetes onset between last follow-up to the current follow-up: No  Did patient have any acute rejection episodes during the follow-up period: No  Post transplant malignancy: No

## 2022-09-02 ENCOUNTER — TELEPHONE (OUTPATIENT)
Dept: TRANSPLANT | Facility: CLINIC | Age: 72
End: 2022-09-02
Payer: MEDICARE

## 2022-09-08 ENCOUNTER — TELEPHONE (OUTPATIENT)
Dept: TRANSPLANT | Facility: CLINIC | Age: 72
End: 2022-09-08
Payer: MEDICARE

## 2022-09-26 ENCOUNTER — TELEPHONE (OUTPATIENT)
Dept: TRANSPLANT | Facility: CLINIC | Age: 72
End: 2022-09-26
Payer: MEDICARE

## 2022-09-30 LAB
EXT ALBUMIN: 4.1
EXT ALKALINE PHOSPHATASE: 132
EXT ALT: 7
EXT AST: 14
EXT BILIRUBIN TOTAL: 0.5
EXT BUN: 29
EXT CALCIUM: 8.2
EXT CHLORIDE: 110
EXT CHOLESTEROL: 170
EXT CO2: 24
EXT CREATININE: 1.8 MG/DL
EXT GFR MDRD AF AMER: 34
EXT GLUCOSE: 105
EXT HDL: 39
EXT HEMATOCRIT: 31.4
EXT HEMOGLOBIN: 9.7
EXT LDL CHOLESTEROL: 107
EXT LYMPH%: 19.2
EXT MONOCYTES%: 9.8
EXT PLATELETS: 133
EXT POTASSIUM: 4.3
EXT PROTEIN TOTAL: 6.5
EXT SEGS%: 71
EXT SODIUM: 144 MMOL/L
EXT TACROLIMUS LVL: 1.7
EXT TRIGLYCERIDES: 126
EXT WBC: 3.6

## 2022-10-03 ENCOUNTER — TELEPHONE (OUTPATIENT)
Dept: TRANSPLANT | Facility: CLINIC | Age: 72
End: 2022-10-03
Payer: MEDICARE

## 2022-10-03 NOTE — TELEPHONE ENCOUNTER
----- Message from Catina Mcgrath MD sent at 10/2/2022 10:18 AM CDT -----  The Labs are stable - please let patient know.

## 2023-01-11 LAB
EXT ALBUMIN: 4.1
EXT ALKALINE PHOSPHATASE: 157
EXT ALT: 20
EXT AST: 44
EXT BASOPHIL%: 0.4
EXT BILIRUBIN TOTAL: 0.4
EXT BUN: 33
EXT CALCIUM: 9
EXT CHLORIDE: 106
EXT CHOLESTEROL: 186
EXT CO2: 25
EXT CREATININE: 1.8 MG/DL
EXT EOSINOPHIL%: 1.8
EXT GFR MDRD AF AMER: 33.9
EXT GLUCOSE: 105
EXT HDL: 43
EXT HEMATOCRIT: 32.7
EXT HEMOGLOBIN: 10.3
EXT LDL CHOLESTEROL: 115
EXT LYMPH%: 18.8
EXT MONOCYTES%: 8.1
EXT PLATELETS: 163
EXT POTASSIUM: 4.6
EXT PROTEIN TOTAL: 6.6
EXT SEGS%: 70.9
EXT SODIUM: 143 MMOL/L
EXT TACROLIMUS LVL: 2.4
EXT TRIGLYCERIDES: 165
EXT WBC: 4.5

## 2023-01-13 ENCOUNTER — TELEPHONE (OUTPATIENT)
Dept: TRANSPLANT | Facility: CLINIC | Age: 73
End: 2023-01-13
Payer: MEDICARE

## 2023-01-13 NOTE — TELEPHONE ENCOUNTER
----- Message from Catina Mcgrath MD sent at 1/12/2023  1:58 PM CST -----  Labs are up- repeat now- please let patient know.

## 2023-01-13 NOTE — TELEPHONE ENCOUNTER
Patient notified and instructed: labs reviewed by  with elevations noted to LFTs. Repeat labs on 1/17/23. Patient able to voice understanding. Lab Order faxed to [facility].

## 2023-01-19 LAB
EXT ALBUMIN: 4.1
EXT ALKALINE PHOSPHATASE: 133
EXT ALT: 16
EXT AST: 22
EXT BILIRUBIN TOTAL: 0.4
EXT BUN: 29
EXT CALCIUM: 9
EXT CHLORIDE: 107
EXT CHOLESTEROL: 202
EXT CO2: 26
EXT CREATININE: 1.7 MG/DL
EXT GFR MDRD AF AMER: 36.7
EXT GLUCOSE: 101
EXT HDL: 47
EXT HEMATOCRIT: 32.2
EXT HEMOGLOBIN: 9.7
EXT LDL CHOLESTEROL: 128
EXT LYMPH%: 28.2
EXT PLATELETS: 132
EXT POTASSIUM: 5.1
EXT PROTEIN TOTAL: 6.3
EXT SEGS%: 61.3
EXT SODIUM: 142 MMOL/L
EXT TACROLIMUS LVL: 1.9
EXT TRIGLYCERIDES: 156
EXT WBC: 2.6

## 2023-01-23 ENCOUNTER — TELEPHONE (OUTPATIENT)
Dept: TRANSPLANT | Facility: CLINIC | Age: 73
End: 2023-01-23
Payer: MEDICARE

## 2023-01-23 NOTE — TELEPHONE ENCOUNTER
Letter sent, labs stable and no medication changes are needed. Repeat labs due 2/13/23 per protocol.  ----- Message from Catina Mcgrath MD sent at 1/20/2023  4:49 PM CST -----  The Labs are improved; repeat in 4 weeks- please let patient know.

## 2023-01-23 NOTE — LETTER
January 23, 2023    Chetna Stock  [address] [place] [address]          Dear Chetna Stock:  MRN: [id number]    This is a follow up to your recent labs. Your liver tests have improved. There are no medicine changes.  Please have your labs drawn again on 2/13/23.      If you cannot have your labs drawn on the scheduled date, it is your responsibility to call the transplant department to reschedule.  Please call [phone] and ask to speak to Erica Booth, Medical Assistant for all scheduling requests.     Sincerely,        Your Liver Transplant Coordinator    [facility]  [address] [place] [phone]

## 2023-01-27 ENCOUNTER — TELEPHONE (OUTPATIENT)
Dept: TRANSPLANT | Facility: CLINIC | Age: 73
End: 2023-01-27
Payer: MEDICARE

## 2023-01-27 NOTE — TELEPHONE ENCOUNTER
Returned call. Spoke with Carolyn. Clarified lab order.  ----- Message from Jonah Pineda sent at 1/27/2023  8:48 AM CST -----  Regarding: call back  Carolyn with [facility] in regards to a lba they recived for a pt needing clarification on the lab order requesting call back    Call

## 2023-02-20 DIAGNOSIS — Z94.4 S/P LIVER TRANSPLANT: ICD-10-CM

## 2023-02-20 LAB
EXT ALBUMIN: 4.2
EXT ALKALINE PHOSPHATASE: 271
EXT ALT: 32
EXT AST: 42
EXT BILIRUBIN TOTAL: 0.4
EXT BUN: 34
EXT CALCIUM: 9
EXT CHLORIDE: 106
EXT CHOLESTEROL: 184
EXT CO2: 25
EXT CREATININE: 1.6 MG/DL
EXT GFR MDRD AF AMER: 38
EXT GLUCOSE: 127
EXT HDL: 46
EXT HEMATOCRIT: 33.3
EXT HEMOGLOBIN: 9.9
EXT LDL CHOLESTEROL: 113
EXT LYMPH%: 25.3
EXT PLATELETS: 123
EXT POTASSIUM: 4.1
EXT PROTEIN TOTAL: 6.7
EXT SEGS%: 66
EXT SODIUM: 143 MMOL/L
EXT TACROLIMUS LVL: 1.9
EXT TRIGLYCERIDES: 131
EXT WBC: 3

## 2023-02-20 RX ORDER — TACROLIMUS 1 MG/1
CAPSULE ORAL
Qty: 150 CAPSULE | Refills: 11 | Status: SHIPPED | OUTPATIENT
Start: 2023-02-20 | End: 2023-05-10 | Stop reason: DRUGHIGH

## 2023-02-20 NOTE — TELEPHONE ENCOUNTER
Patient notified and instructed to increase Prograf dose to 3 in the  AM and 2 in the PM (due to elevations of liver tests) Repeat labs in 2 weeks (due 3/6/23). Patient was able to repeat instructions and voiced understanding.

## 2023-02-20 NOTE — TELEPHONE ENCOUNTER
----- Message from Catina Mcgrath MD sent at 2/20/2023 12:08 PM CST -----  The Liver labs are up-increase prograf to  3/2 from 2/2 and repeat labs in 2 weeks- please let patient know.

## 2023-03-15 LAB
EXT ALBUMIN: 4.1
EXT ALKALINE PHOSPHATASE: 335
EXT ALT: 48
EXT AST: 66
EXT BILIRUBIN TOTAL: 0.4
EXT BUN: 38
EXT CALCIUM: 8.4
EXT CHLORIDE: 105
EXT CO2: 25
EXT CREATININE: 1.8 MG/DL
EXT GFR MDRD AF AMER: 33.4
EXT GLUCOSE: 118
EXT POTASSIUM: 4.5
EXT PROTEIN TOTAL: 6.5
EXT SODIUM: 141 MMOL/L
EXT TACROLIMUS LVL: ABNORMAL

## 2023-03-16 RX ORDER — PREDNISONE 20 MG/1
20 TABLET ORAL DAILY
Qty: 30 TABLET | Refills: 3 | Status: SHIPPED | OUTPATIENT
Start: 2023-03-16 | End: 2023-04-19 | Stop reason: DRUGHIGH

## 2023-03-16 NOTE — TELEPHONE ENCOUNTER
Patient notified and instructed: liver tests are elevated; will need to start Prednisone 20mg daily (Rx. Will be sent per  to local pharmacy); will need an ultrasound scheduled , and will need weekly labs for now.  Patient will get labs and U/S at [facility], she said(orders will be faxed to them).

## 2023-03-16 NOTE — TELEPHONE ENCOUNTER
----- Message from Catina Mcgrath MD sent at 3/15/2023  8:15 PM CDT -----  Labs are elevated. Recommend abdo US with doppler. Start prednisone 20 mg daily and check labs weekly including prograf level.

## 2023-03-21 ENCOUNTER — TELEPHONE (OUTPATIENT)
Dept: TRANSPLANT | Facility: CLINIC | Age: 73
End: 2023-03-21
Payer: MEDICARE

## 2023-03-21 NOTE — TELEPHONE ENCOUNTER
----- Message from Sherita Robin MA sent at 3/20/2023  9:21 AM CDT -----  Contact: [phone]  Patient is calling states the orders needs to be put in. Please call and advise.      Mallika [phone] ext [phone]

## 2023-03-21 NOTE — TELEPHONE ENCOUNTER
----- Message from Trinity To RN sent at 3/21/2023 11:36 AM CDT -----  Contact: [phone]    ----- Message -----  From: Sherita Robin MA  Sent: 3/20/2023   9:24 AM CDT  To: [facility] Post-Kidney Transplant Clinical    Patient is calling states the orders needs to be put in. Please call and advise.      Mallika [phone] ext [phone]

## 2023-03-21 NOTE — TELEPHONE ENCOUNTER
Returned call to Carolyn at  [facility]: confirmed ICD 10 codes for weekly labs and Liver U/S , she will get MD to co-sign orders for Liver Transplant Ultrasound with doppler.  ICD 10 give was for elevated LFTs

## 2023-03-21 NOTE — TELEPHONE ENCOUNTER
----- Message from Hailey Estrella sent at 3/21/2023  3:32 PM CDT -----  Regarding: Speak to staff  Carolyn kidd [facility] is  Calling to speak to staff, in regards to having weekly labs that are needed, need to know what type of labs they are requesting. Please Advise.            [phone]

## 2023-03-29 LAB
EXT ALBUMIN: 4.1
EXT ALKALINE PHOSPHATASE: 218
EXT ALT: 29
EXT AST: 22
EXT BILIRUBIN DIRECT: 0.1 MG/DL
EXT BILIRUBIN TOTAL: 0.4
EXT PROTEIN TOTAL: 6.4

## 2023-03-30 ENCOUNTER — TELEPHONE (OUTPATIENT)
Dept: TRANSPLANT | Facility: CLINIC | Age: 73
End: 2023-03-30
Payer: MEDICARE

## 2023-03-30 NOTE — TELEPHONE ENCOUNTER
----- Message from Fatemeh Blanco sent at 3/30/2023  4:03 PM CDT -----  Regarding: FW: orders    ----- Message -----  From: Haley Dupree  Sent: 3/30/2023   3:43 PM CDT  To: [facility] Post-Liver Transplant Clinical  Subject: orders                                           The patient called requesting to speak to staff   Regarding some testing orders that were to be sent - [facility]    Please contact -  Carolyn    ext [phone]                               FAX # [phone]     No further information provided    Patient can be contacted @# [phone] (home)

## 2023-03-31 ENCOUNTER — TELEPHONE (OUTPATIENT)
Dept: TRANSPLANT | Facility: CLINIC | Age: 73
End: 2023-03-31
Payer: MEDICARE

## 2023-04-04 ENCOUNTER — TELEPHONE (OUTPATIENT)
Dept: TRANSPLANT | Facility: CLINIC | Age: 73
End: 2023-04-04
Payer: MEDICARE

## 2023-04-04 LAB
EXT ALBUMIN: 4.1
EXT ALKALINE PHOSPHATASE: 234
EXT ALT: 23
EXT AST: 29
EXT BILIRUBIN TOTAL: 0.3
EXT BUN: 44
EXT CALCIUM: 8.9
EXT CHLORIDE: 106
EXT CO2: 26
EXT CREATININE: 2 MG/DL
EXT GFR MDRD AF AMER: 30.1
EXT GLUCOSE: 127
EXT HEMATOCRIT: 34.9
EXT HEMOGLOBIN: 10.7
EXT LYMPH%: 20.7
EXT MONOCYTES%: 7.5
EXT PLATELETS: 126
EXT POTASSIUM: 4.5
EXT PROTEIN TOTAL: 6.4
EXT SEGS%: 71.8
EXT SODIUM: 142 MMOL/L
EXT TACROLIMUS LVL: 2.3
EXT WBC: 6.6

## 2023-04-04 NOTE — TELEPHONE ENCOUNTER
----- Message from Catina Mcgrath MD sent at 4/4/2023  1:12 PM CDT -----  The Labs are stable - please let patient know.

## 2023-04-04 NOTE — TELEPHONE ENCOUNTER
----- Message from Haley Dupree sent at 4/4/2023  9:25 AM CDT -----  Regarding: lab orders weekly   Carolyn at [facility] called asking what are the labs that are needing to be drawn for this patient on a weekly basis  Advised coordinator sent lab orders on Friday 3/31 for CBC, CMP & prograf levels    FAX #  [phone] please put ATTN- Carolyn on orders     16-Jan-2020 16:58

## 2023-04-11 ENCOUNTER — TELEPHONE (OUTPATIENT)
Dept: TRANSPLANT | Facility: CLINIC | Age: 73
End: 2023-04-11
Payer: MEDICARE

## 2023-04-11 NOTE — TELEPHONE ENCOUNTER
----- Message from Jonah Pineda sent at 4/11/2023  2:20 PM CDT -----  Regarding: call back  Pt returning Fatemeh miss call   Call

## 2023-04-11 NOTE — TELEPHONE ENCOUNTER
----- Message from Haley Dupree sent at 4/11/2023  1:38 PM CDT -----  Regarding: Speak to Nurse  The patient called requesting to speak to Nurse Weldon. States she is not feeling well with the Prednisone states having shortness of breath, dizziness, rapid heart beat Please reach out at your earliest opportunity to advise    No further information provided      Patient can be contacted @# [phone]

## 2023-04-11 NOTE — TELEPHONE ENCOUNTER
"Returned patient call; she reported that she has been short of breath with dizziness/ heart racing for "about a week" , asking if she will be on Prednisone for much longer.  She said she was seen by her kidney doctor about  1-2 weeks ago, and everything was "fine", she reported she had labs drawn yesterday.    Results pending. Informed her I will review above with . also instructed her to seek assist from her PCP re: her recent symptoms. She agreed to do so.     "

## 2023-04-12 LAB
EXT ALBUMIN: 4.1
EXT ALKALINE PHOSPHATASE: 194
EXT ALT: 20
EXT AST: 14
EXT BILIRUBIN TOTAL: 0.5
EXT BUN: 46
EXT CALCIUM: 8.8
EXT CHLORIDE: 104
EXT CO2: 25
EXT CREATININE: 1.9 MG/DL
EXT GFR MDRD AF AMER: 31.4
EXT GLUCOSE: 215
EXT HEMATOCRIT: 35.1
EXT HEMOGLOBIN: 11
EXT LYMPH%: 24.4
EXT MONOCYTES%: 5.5
EXT PLATELETS: 130
EXT POTASSIUM: 3.9
EXT PROTEIN TOTAL: 6.1
EXT SEGS%: 70.1
EXT SODIUM: 140 MMOL/L
EXT TACROLIMUS LVL: 2.1
EXT WBC: 6.9

## 2023-04-19 ENCOUNTER — TELEPHONE (OUTPATIENT)
Dept: TRANSPLANT | Facility: CLINIC | Age: 73
End: 2023-04-19
Payer: MEDICARE

## 2023-04-19 RX ORDER — PREDNISONE 5 MG/1
TABLET ORAL
Qty: 38 TABLET | Refills: 0 | Status: SHIPPED | OUTPATIENT
Start: 2023-04-20 | End: 2023-05-10 | Stop reason: ALTCHOICE

## 2023-04-19 NOTE — TELEPHONE ENCOUNTER
----- Message from Catina Mcgrath MD sent at 4/19/2023  2:54 PM CDT -----  Taper off pred: 15 mg x 5 days then 10 mg x 5 days; 7.5 mg daily x 5 days then 5 mg daily x 5 days then off  ----- Message -----  From: Fatemeh Blanco  Sent: 4/19/2023   1:15 PM CDT  To: Catina Mcgrath MD    Patient asking for wean from Prednisone; said she saw her local doctor yesterday, with symptoms of SOB/ shaky/ dry mouth, going on for about 4 weeks she said. She said her doctor there thinks it is from the prednisone.   ----- Message -----  From: Catina Mcgrath MD  Sent: 4/17/2023   4:59 PM CDT  To: [facility] Post-Liver Transplant Clinical    The Labs are stable - please let patient know.

## 2023-04-19 NOTE — TELEPHONE ENCOUNTER
----- Message from Catina Mcgrath MD sent at 4/17/2023  4:59 PM CDT -----  The Labs are stable - please let patient know.

## 2023-04-19 NOTE — TELEPHONE ENCOUNTER
Patient notified and given instructions for Prednisone taper as per .  she reported writing it down. Will send Rx. To [facility] pharmacy for 5mg pills and taper instructions per patient request. Weekly labs for now. Patient repeated instructions and voiced understanding.

## 2023-04-20 ENCOUNTER — TELEPHONE (OUTPATIENT)
Dept: TRANSPLANT | Facility: CLINIC | Age: 73
End: 2023-04-20
Payer: MEDICARE

## 2023-04-24 ENCOUNTER — TELEPHONE (OUTPATIENT)
Dept: TRANSPLANT | Facility: CLINIC | Age: 73
End: 2023-04-24
Payer: MEDICARE

## 2023-04-24 NOTE — TELEPHONE ENCOUNTER
"----- Message from Ivan Patten sent at 4/24/2023  9:59 AM CDT -----  Consult/Advisory:          Name Of Caller: Self      Contact Preference?: [phone]      What is the nature of the call?: Calling to speak w/ Fatemeh. Stating she won't be having lab work done due to her currently being admitted @ [facility] (in [place], [place])          Additional Notes:  "Thank you for all that you do for our patients"      "

## 2023-04-24 NOTE — TELEPHONE ENCOUNTER
"Returned patient call: she reported she is in hospital at [facility]. Due to "shortness of breath, my family brought me in ", she said.   Patient reported  that her "kidney doctor  lowered my Prograf dose to 2mg twice a day, and my Prednisone dose is down to 10mg daily".  Patient reported also that she had "an Echocardiogram and the doctor said I have leaking in my valve, and my sugars are 400.", she said.  Instructed her to call back once she is discharged ,with an update. She voiced understanding.   "

## 2023-04-24 NOTE — TELEPHONE ENCOUNTER
Patient also reported that her inpatient doctor at [facility] said he spoke to the [facility] doctor .

## 2023-04-25 RX ORDER — OMEPRAZOLE 40 MG/1
CAPSULE, DELAYED RELEASE ORAL
Qty: 30 CAPSULE | Refills: 11 | Status: SHIPPED | OUTPATIENT
Start: 2023-04-25 | End: 2023-12-26

## 2023-04-25 NOTE — TELEPHONE ENCOUNTER
----- Message from Marina Huffman sent at 4/25/2023  3:17 PM CDT -----  Regarding: Questions  Pt wants to speak with coordinator Fatemeh. They wanted to provide an update. Pt was admitted in the hospital on Friday 04/21. They were discharged yesterday. Pt also has questions about their medications.      Chetna @ [phone]

## 2023-04-25 NOTE — TELEPHONE ENCOUNTER
Returned patient call. She reported that she was discharged from local hospital yesterday. She said she is now on insulin.  Discharged on prednisone 10 mg daily and will resume taper given by .  will repeat labs on Monday (5/1/23), she said.

## 2023-05-08 LAB
EXT ALBUMIN: 3.9
EXT ALKALINE PHOSPHATASE: 188
EXT ALT: 23
EXT AST: 31
EXT BILIRUBIN TOTAL: 0.5
EXT BUN: 32
EXT CALCIUM: 8.5
EXT CHLORIDE: 107
EXT CO2: 26
EXT CREATININE: 1.8 MG/DL
EXT GFR MDRD AF AMER: 34
EXT GLUCOSE: 91
EXT HEMATOCRIT: 30.4
EXT HEMOGLOBIN: 9.3
EXT LYMPH%: 27.2
EXT PLATELETS: 135
EXT POTASSIUM: 4.2
EXT PROTEIN TOTAL: 5.9
EXT SEGS%: 62.5
EXT SODIUM: 142 MMOL/L
EXT TACROLIMUS LVL: 1.4
EXT WBC: 3.3

## 2023-05-10 DIAGNOSIS — Z94.4 S/P LIVER TRANSPLANT: ICD-10-CM

## 2023-05-10 RX ORDER — TACROLIMUS 1 MG/1
3 CAPSULE ORAL EVERY 12 HOURS
Qty: 180 CAPSULE | Refills: 11 | Status: SHIPPED | OUTPATIENT
Start: 2023-05-10 | End: 2023-05-11 | Stop reason: SDUPTHER

## 2023-05-10 NOTE — TELEPHONE ENCOUNTER
Message left for patient with instructions:  Labs reviewed by ; liver labs stable. Please increase Prograf dose to 3mg in the AM and 3 mg in the PM. and repeat labs in one month(due 6/5/23). Requested a call back for any questions or to confirm receipt of instructions.      Patient returned call immediately: was given above instructions ; she was able to voice understanding.

## 2023-05-10 NOTE — TELEPHONE ENCOUNTER
----- Message from Catina Mcgrath MD sent at 5/8/2023  8:34 PM CDT -----  The Labs are stable; increase prograf to 3/3 frm 3/2 and repeat labs in 1 month - please let patient know.

## 2023-05-11 DIAGNOSIS — Z94.4 S/P LIVER TRANSPLANT: ICD-10-CM

## 2023-05-11 RX ORDER — TACROLIMUS 1 MG/1
3 CAPSULE ORAL EVERY 12 HOURS
Qty: 180 CAPSULE | Refills: 11 | Status: SHIPPED | OUTPATIENT
Start: 2023-05-11 | End: 2023-06-30 | Stop reason: DRUGHIGH

## 2023-05-25 DIAGNOSIS — Z94.4 S/P LIVER TRANSPLANT: ICD-10-CM

## 2023-05-25 RX ORDER — MYCOPHENOLATE MOFETIL 250 MG/1
CAPSULE ORAL
Qty: 120 CAPSULE | Refills: 11 | Status: SHIPPED | OUTPATIENT
Start: 2023-05-25 | End: 2023-09-07 | Stop reason: SDUPTHER

## 2023-06-07 ENCOUNTER — TELEPHONE (OUTPATIENT)
Dept: TRANSPLANT | Facility: CLINIC | Age: 73
End: 2023-06-07
Payer: MEDICARE

## 2023-06-07 NOTE — TELEPHONE ENCOUNTER
----- Message from Yonatan Mcneil sent at 6/7/2023 12:00 PM CDT -----  Regarding: Patient Update  Patient called to in to simply inform her nurse that her lab results will be late as she is out of town.                           Contact: [phone]

## 2023-06-20 ENCOUNTER — TELEPHONE (OUTPATIENT)
Dept: TRANSPLANT | Facility: CLINIC | Age: 73
End: 2023-06-20
Payer: MEDICARE

## 2023-06-27 LAB
EXT ALBUMIN: 4.1
EXT ALBUMIN: 4.1
EXT ALKALINE PHOSPHATASE: 181
EXT ALKALINE PHOSPHATASE: 230
EXT ALT: 21
EXT ALT: 23
EXT AST: 22
EXT AST: 56
EXT BILIRUBIN TOTAL: 0.4
EXT BILIRUBIN TOTAL: 0.4
EXT BUN: 25
EXT BUN: 40
EXT CALCIUM: 6.8
EXT CALCIUM: 9.1
EXT CHLORIDE: 109
EXT CHLORIDE: 109
EXT CO2: 23
EXT CO2: 26
EXT CREATININE: 1.6 MG/DL
EXT CREATININE: 1.9 MG/DL
EXT GFR MDRD AF AMER: 26.5
EXT GFR MDRD AF AMER: 39.7
EXT GLUCOSE: 113
EXT GLUCOSE: 93
EXT HEMATOCRIT: 31.2
EXT HEMATOCRIT: 32.4
EXT HEMOGLOBIN: 9.3
EXT HEMOGLOBIN: 9.9
EXT LYMPH%: 24.9
EXT LYMPH%: 26.1
EXT MONOCYTES%: 14.3
EXT MONOCYTES%: 8.4
EXT PLATELETS: 134
EXT PLATELETS: 137
EXT POTASSIUM: 4
EXT POTASSIUM: 4.7
EXT PROTEIN TOTAL: 6
EXT PROTEIN TOTAL: 6.1
EXT SEGS%: 59.6
EXT SEGS%: 66.7
EXT SODIUM: 140 MMOL/L
EXT SODIUM: 143 MMOL/L
EXT TACROLIMUS LVL: 1.9
EXT TACROLIMUS LVL: 2.7
EXT WBC: 2.4
EXT WBC: 3.5

## 2023-06-30 DIAGNOSIS — Z94.4 S/P LIVER TRANSPLANT: ICD-10-CM

## 2023-06-30 RX ORDER — TACROLIMUS 1 MG/1
4 CAPSULE ORAL EVERY 12 HOURS
Qty: 240 CAPSULE | Refills: 11 | Status: SHIPPED | OUTPATIENT
Start: 2023-06-30 | End: 2023-09-07 | Stop reason: SDUPTHER

## 2023-06-30 NOTE — TELEPHONE ENCOUNTER
Patient notified and instructed Increase Prograf dose to 4mg twice a day, repeat labs one week later.  Increase hydration.    Patient was able to repeat instructions and voiced understanding. Stated she will have repeat labs on Friday 7/7/23.

## 2023-06-30 NOTE — TELEPHONE ENCOUNTER
----- Message from Christiane Gonzalez MD sent at 6/30/2023 10:40 AM CDT -----  labs look worse, increase tacro to 4/4, repeat labs after a week.  Encourage better hydration

## 2023-07-03 ENCOUNTER — TELEPHONE (OUTPATIENT)
Dept: TRANSPLANT | Facility: CLINIC | Age: 73
End: 2023-07-03
Payer: MEDICARE

## 2023-07-03 NOTE — TELEPHONE ENCOUNTER
Returned call, pharmacy told her they could not get her meds approved but when she called them back, the med was ready.  Verified dose of Prograf 4 mg every 12 hours. ----- Message from Marina Huffman sent at 7/3/2023 12:53 PM CDT -----  Regarding: Questions  Pt wants to speak with Fatemeh. She has questions about her medications. If the pt is not available please a voicemail.      Chetna @ [phone]

## 2023-07-18 LAB
EXT ALBUMIN: 4.2
EXT ALKALINE PHOSPHATASE: 270
EXT ALT: 22
EXT AST: 24
EXT BILIRUBIN TOTAL: 0.4
EXT BUN: 24
EXT CALCIUM: 9.1
EXT CHLORIDE: 106
EXT CO2: 26
EXT CREATININE: 2 MG/DL
EXT GFR MDRD AF AMER: 29
EXT GLUCOSE: 136
EXT HEMATOCRIT: 33.8
EXT HEMOGLOBIN: 10.2
EXT LYMPH%: 24.5
EXT MONOCYTES%: 9.9
EXT PLATELETS: 138
EXT POTASSIUM: 4.1
EXT PROTEIN TOTAL: 6.3
EXT SEGS%: 65.6
EXT SODIUM: 139 MMOL/L
EXT TACROLIMUS LVL: 5.2
EXT WBC: 3

## 2023-07-24 ENCOUNTER — TELEPHONE (OUTPATIENT)
Dept: TRANSPLANT | Facility: CLINIC | Age: 73
End: 2023-07-24
Payer: MEDICARE

## 2023-07-24 NOTE — TELEPHONE ENCOUNTER
Patient was notified and instructed : labs reviewed by ; results stable. No changes made. Repeat labs due 7/31/23. She was able to repeat instructions and voiced understanding.    Primary Defect Width In Cm (Final Defect Size - Required For Flaps/Grafts): 1

## 2023-07-24 NOTE — TELEPHONE ENCOUNTER
----- Message from Sergio Khan MD sent at 7/20/2023  9:37 AM CDT -----  Liver transplant blood tests reviewed. Labs stable, no change in immunosuppression. Please continue to monitor liver tests per transplant protocol.

## 2023-08-09 ENCOUNTER — TELEPHONE (OUTPATIENT)
Dept: TRANSPLANT | Facility: CLINIC | Age: 73
End: 2023-08-09
Payer: MEDICARE

## 2023-08-28 ENCOUNTER — TELEPHONE (OUTPATIENT)
Dept: TRANSPLANT | Facility: CLINIC | Age: 73
End: 2023-08-28
Payer: MEDICARE

## 2023-08-28 NOTE — TELEPHONE ENCOUNTER
----- Message from Haley Dupree sent at 8/28/2023  3:25 PM CDT -----  Regarding: lab results  The patient called requesting return call regarding results of her labs  Please contact at    No further information provided      Patient can be contacted @# [phone] (home)

## 2023-08-31 ENCOUNTER — TELEPHONE (OUTPATIENT)
Dept: TRANSPLANT | Facility: CLINIC | Age: 73
End: 2023-08-31
Payer: MEDICARE

## 2023-08-31 NOTE — TELEPHONE ENCOUNTER
----- Message from Donna Durand sent at 8/31/2023  1:44 PM CDT -----  Regarding: Consult Advisory  Contact: Pt  Pt is requesting  a  callback  regarding medication Advice. She stated the insurance is only authorizing a 1 month supply of medication and need a script sent over. Please adv       Medication:tacrolimus (PROGRAF) 1 MG Cap      Confirmed contact below:   Contact Name:Chetna Stock  Phone Number: [phone]

## 2023-09-01 ENCOUNTER — TELEPHONE (OUTPATIENT)
Dept: TRANSPLANT | Facility: CLINIC | Age: 73
End: 2023-09-01
Payer: MEDICARE

## 2023-09-01 LAB
EXT ALBUMIN: 4.1
EXT ALKALINE PHOSPHATASE: 242
EXT ALT: 12
EXT AST: 17
EXT BILIRUBIN TOTAL: 0.3
EXT BUN: 32
EXT CALCIUM: 8.5
EXT CHLORIDE: 107
EXT CO2: 25
EXT CREATININE: 2 MG/DL
EXT GFR MDRD AF AMER: 29.4
EXT GLUCOSE: 115
EXT HEMATOCRIT: 33
EXT HEMOGLOBIN: 10.1
EXT LYMPH%: 26.9
EXT PLATELETS: 127
EXT POTASSIUM: 3.7
EXT PROTEIN TOTAL: 6.2
EXT SEGS%: 62
EXT SODIUM: 142 MMOL/L
EXT TACROLIMUS LVL: 1.5
EXT WBC: 3.2

## 2023-09-01 NOTE — TELEPHONE ENCOUNTER
----- Message from Catina Mcgrath MD sent at 9/1/2023 11:46 AM CDT -----  The Labs are stable; prograf a bit low- repeat in one month - please let patient know.

## 2023-09-07 DIAGNOSIS — Z94.4 S/P LIVER TRANSPLANT: ICD-10-CM

## 2023-09-07 RX ORDER — TACROLIMUS 1 MG/1
4 CAPSULE ORAL EVERY 12 HOURS
Qty: 240 CAPSULE | Refills: 11 | Status: SHIPPED | OUTPATIENT
Start: 2023-09-07 | End: 2023-12-08 | Stop reason: SDUPTHER

## 2023-09-07 RX ORDER — MYCOPHENOLATE MOFETIL 250 MG/1
CAPSULE ORAL
Qty: 120 CAPSULE | Refills: 11 | Status: SHIPPED | OUTPATIENT
Start: 2023-09-07

## 2023-09-08 ENCOUNTER — TELEPHONE (OUTPATIENT)
Dept: TRANSPLANT | Facility: CLINIC | Age: 73
End: 2023-09-08
Payer: MEDICARE

## 2023-09-08 NOTE — TELEPHONE ENCOUNTER
----- Message from Sherita Robin MA sent at 9/8/2023 10:38 AM CDT -----  Regarding: B vs D determination  B vs D determination going on   Drug plan call this number so meds can get approved.[phone]     mycophenolate (CELLCEPT) 250 mg Cap      tacrolimus (PROGRAF) 1 MG Cap    [facility] AT [facility] [place] [facility]  AT [facility]

## 2023-09-08 NOTE — TELEPHONE ENCOUNTER
----- Message from Haley Dupree sent at 9/8/2023 11:26 AM CDT -----  Regarding: out of cellcept  Contact: PT  [phone]  The patient called requesting to speak to Nurse Fatemeh  She talked with company this morning an d they need a letter that states patient was working during her transplant    Please reach out to patient at your earliest opportunity for a status update on what's going on with her getting the medication For the cellcept medication  Patient states she is out      No further information provided      Patient can be contacted @# [phone]

## 2023-10-03 ENCOUNTER — TELEPHONE (OUTPATIENT)
Dept: TRANSPLANT | Facility: CLINIC | Age: 73
End: 2023-10-03
Payer: MEDICARE

## 2023-10-03 NOTE — TELEPHONE ENCOUNTER
----- Message from Marina Huffman sent at 10/3/2023 10:38 AM CDT -----  Regarding: Refill      Name Of Caller:   Liam      Contact Preference:   [phone]      Nature of call:   Pt is requesting a refill for zolpidem (AMBIEN) 10 mg Tab. Please contact [facility] at Phone #: [phone] or fax #: [phone].

## 2023-10-27 ENCOUNTER — TELEPHONE (OUTPATIENT)
Dept: TRANSPLANT | Facility: CLINIC | Age: 73
End: 2023-10-27
Payer: MEDICARE

## 2023-10-27 LAB
EXT ALBUMIN: 4.1
EXT ALKALINE PHOSPHATASE: 400
EXT ALT: 44
EXT AST: 53
EXT BILIRUBIN TOTAL: 0.5
EXT BUN: 31
EXT CALCIUM: 8.8
EXT CHLORIDE: 105
EXT CO2: 25
EXT CREATININE: 2.2 MG/DL
EXT GFR MDRD AF AMER: 26
EXT GLUCOSE: 131
EXT HEMATOCRIT: 34
EXT HEMOGLOBIN: 10.7
EXT LYMPH%: 23.2
EXT PLATELETS: 112
EXT POTASSIUM: 4
EXT PROTEIN TOTAL: 8.1
EXT SEGS%: 62
EXT SODIUM: 144 MMOL/L
EXT WBC: 2.9

## 2023-10-27 NOTE — TELEPHONE ENCOUNTER
Patient notified and instructed : labs of 10/2/23 received ; lab reported no Prograf level done.  Patient instructed : needs repeat labs next week ; she was able to repeat instructions and voiced understanding . Fax lab orders to [facility] , she requested.

## 2023-11-01 ENCOUNTER — TELEPHONE (OUTPATIENT)
Dept: TRANSPLANT | Facility: CLINIC | Age: 73
End: 2023-11-01
Payer: MEDICARE

## 2023-11-01 NOTE — TELEPHONE ENCOUNTER
----- Message from Catina Mcgrath MD sent at 10/30/2023  5:42 PM CDT -----  The Labs are up- need repeat labs!- please let patient know.

## 2023-11-17 LAB
EXT ALBUMIN: 4
EXT ALKALINE PHOSPHATASE: 346
EXT ALT: 27
EXT AST: 43
EXT BILIRUBIN TOTAL: 0.4
EXT BUN: 37
EXT CALCIUM: 8.4
EXT CHLORIDE: 107
EXT CO2: 25
EXT CREATININE: 2.6 MG/DL
EXT GFR MDRD AF AMER: 22
EXT GLUCOSE: 115
EXT HEMATOCRIT: 30.7
EXT HEMOGLOBIN: 9.4
EXT LYMPH%: 27.1
EXT PLATELETS: 117
EXT POTASSIUM: 3.9
EXT PROTEIN TOTAL: 8.1
EXT SEGS%: 63.3
EXT SODIUM: 141 MMOL/L
EXT WBC: 2.3

## 2023-11-22 LAB — EXT TACROLIMUS LVL: 4.6

## 2023-11-27 ENCOUNTER — TELEPHONE (OUTPATIENT)
Dept: TRANSPLANT | Facility: CLINIC | Age: 73
End: 2023-11-27
Payer: MEDICARE

## 2023-11-27 NOTE — TELEPHONE ENCOUNTER
----- Message from Catina Mcgrath MD sent at 11/27/2023  8:17 AM CST -----  Repeat labs now- creat was higher than usual. - please let patient know.

## 2023-11-27 NOTE — TELEPHONE ENCOUNTER
Message left for patient returning her call, and instructed her to repeat labs this week , due to kidney function test (createnine) higher that usual.

## 2023-12-06 LAB
EXT ALBUMIN: 4.2
EXT ALKALINE PHOSPHATASE: 240
EXT ALT: 16
EXT AST: 21
EXT BILIRUBIN TOTAL: 0.5
EXT BUN: 33
EXT CALCIUM: 8.8
EXT CHLORIDE: 108
EXT CO2: 25
EXT CREATININE: 1.9 MG/DL
EXT GFR MDRD AF AMER: 32.1
EXT GLUCOSE: 117
EXT HEMATOCRIT: 34.1
EXT HEMOGLOBIN: 10.5
EXT LYMPH%: 23.8
EXT PLATELETS: 125
EXT POTASSIUM: 4.3
EXT PROTEIN TOTAL: 6.1
EXT SEGS%: 72.8
EXT SODIUM: 141 MMOL/L
EXT TACROLIMUS LVL: 5.1
EXT WBC: 2.6

## 2023-12-08 DIAGNOSIS — Z94.4 S/P LIVER TRANSPLANT: ICD-10-CM

## 2023-12-08 NOTE — TELEPHONE ENCOUNTER
Called patient and left a VM with med change and to repeat labs 1/8/24----- Message from Catina Mcgrath MD sent at 12/8/2023 12:39 PM CST -----  The Labs are stable; lower prograf to 4/3 from 4/4 and repeat labs in one month - please let patient know.

## 2023-12-10 RX ORDER — TACROLIMUS 1 MG/1
CAPSULE ORAL
Qty: 210 CAPSULE | Refills: 11 | Status: SHIPPED | OUTPATIENT
Start: 2023-12-10

## 2023-12-26 RX ORDER — OMEPRAZOLE 40 MG/1
CAPSULE, DELAYED RELEASE ORAL
Qty: 30 CAPSULE | Refills: 11 | Status: SHIPPED | OUTPATIENT
Start: 2023-12-26

## 2024-01-19 ENCOUNTER — TELEPHONE (OUTPATIENT)
Dept: TRANSPLANT | Facility: CLINIC | Age: 74
End: 2024-01-19
Payer: MEDICARE

## 2024-02-21 ENCOUNTER — TELEPHONE (OUTPATIENT)
Dept: TRANSPLANT | Facility: CLINIC | Age: 74
End: 2024-02-21
Payer: MEDICARE

## 2024-02-21 LAB
EXT ALBUMIN: 3.9
EXT ALKALINE PHOSPHATASE: 353
EXT ALT: 30
EXT AST: 71
EXT BILIRUBIN TOTAL: 0.5
EXT BUN: 35
EXT CALCIUM: 8.5
EXT CHLORIDE: 106
EXT CO2: 25
EXT CREATININE: 2.1 MG/DL
EXT GFR MDRD AF AMER: 27.9
EXT GLUCOSE: 100
EXT HEMATOCRIT: 30.6
EXT HEMOGLOBIN: 9.4
EXT LYMPH%: 21.2
EXT PLATELETS: 121
EXT POTASSIUM: 4.2
EXT PROTEIN TOTAL: 6
EXT SEGS%: 69.1
EXT SODIUM: 140 MMOL/L
EXT TACROLIMUS LVL: 13.7
EXT WBC: 2.7

## 2024-02-21 NOTE — TELEPHONE ENCOUNTER
"  Individual Psychotherapy (PhD/LCSW)    11/16/2022    Site: [place]    Therapeutic Intervention: Met with patient alone  Outpatient - Insight oriented psychotherapy 45 min - CPT code 60619    Chief complaint/reason for encounter: anxiety     Interval history and content of current session: Pt had strong reaction at dinner with W and his boys to feeling like "an outsider", triggering memories of this feeling with M and her daughters, and in childhood. Discuss ways to manage this in future.     Treatment plan:  Target symptoms: anxiety   Why chosen therapy is appropriate versus another modality: relevant to diagnosis  Outcome monitoring methods: self-report  Therapeutic intervention type: insight oriented psychotherapy    Risk parameters:  Patient reports no suicidal ideation  Patient reports no homicidal ideation  Patient reports no self-injurious behavior  Patient reports no violent behavior    Verbal deficits: None    Patient's response to intervention:  The patient's response to intervention is motivated.    Progress toward goals and other mental status changes:  The patient's progress toward goals is good.    Diagnosis:   Adjustment disorder with anxiety    Plan:  individual psychotherapy    Return to clinic:  pt will schedule further appointments    Length of Service (minutes): 45                                " Rec'd. Labs dated 1/22/24; noted elevation to LFTs from previous.   Spoke to patient and instructed to have repeat labs this week,  informed.

## 2024-02-23 ENCOUNTER — TELEPHONE (OUTPATIENT)
Dept: TRANSPLANT | Facility: CLINIC | Age: 74
End: 2024-02-23
Payer: MEDICARE

## 2024-02-23 NOTE — TELEPHONE ENCOUNTER
----- Message from Melissa Bunn MA sent at 2/22/2024  4:01 PM CST -----  Regarding: FW: Lab orders  Contact: Chetna    ----- Message -----  From: Hailey Estrella  Sent: 2/22/2024   3:13 PM CST  To: [facility] Post-Liver Transplant Non-Clinical  Subject: Lab orders                                       Consult/Advisory     Name Of Caller:Chetna Stock          Contact Preference:   [phone]       Nature of call:Caller for lab orders to be sent to . Please advise.       [facility]  (nurse Carolyn)  [phone]  ( phone)

## 2024-02-26 ENCOUNTER — TELEPHONE (OUTPATIENT)
Dept: TRANSPLANT | Facility: CLINIC | Age: 74
End: 2024-02-26
Payer: MEDICARE

## 2024-02-26 NOTE — TELEPHONE ENCOUNTER
----- Message from Jonah Pineda sent at 2/26/2024  1:30 PM CST -----  Regarding: call back  Pt call to lucila Weldon know she just did labs     Call

## 2024-03-18 ENCOUNTER — TELEPHONE (OUTPATIENT)
Dept: TRANSPLANT | Facility: CLINIC | Age: 74
End: 2024-03-18
Payer: MEDICARE

## 2024-03-18 NOTE — TELEPHONE ENCOUNTER
Informed by MA that unable to get lab results due on 2/26/24 after multiple calls to lab ([facility]) and to patient who called as well, she said.     Called and spoke to Dr.Jeffery Sylvester's nurse at [facility], she confirmed that patient did have labs drawn and transferred call to Medical Records dept. Who requested a faxed request to be faxed to them at [phone] - Faxed request on Transplant Letter Head Fax Cover Sheet, marked Urgent.   Noted FK levels done at [facility]: spoke to [facility] rep. And result was received by Fax (3.3).

## 2024-03-18 NOTE — TELEPHONE ENCOUNTER
----- Message from Fatemeh Blanco sent at 2/26/2024  1:32 PM CST -----    ----- Message -----  From: Fatemeh Blanco  Sent: 2/21/2024  10:40 AM CST  To: Fatemeh Blanco

## 2024-03-19 LAB
EXT ALBUMIN: 3.9
EXT ALKALINE PHOSPHATASE: 291
EXT ALT: 23
EXT AST: 47
EXT BILIRUBIN TOTAL: 0.4
EXT BUN: 37
EXT CALCIUM: 8.7
EXT CHLORIDE: 105
EXT CO2: 27
EXT CREATININE: 2.3 MG/DL
EXT GFR MDRD AF AMER: 25.4
EXT GLUCOSE: 126
EXT HEMATOCRIT: 30.2
EXT HEMOGLOBIN: 9.2
EXT LYMPH%: 20.5
EXT MONOCYTES%: 24.5
EXT PLATELETS: 137
EXT POTASSIUM: 4.3
EXT PROTEIN TOTAL: 5.9
EXT SEGS%: 55
EXT SODIUM: 143 MMOL/L
EXT TACROLIMUS LVL: 3.3
EXT WBC: 2.5

## 2024-03-20 ENCOUNTER — TELEPHONE (OUTPATIENT)
Dept: TRANSPLANT | Facility: CLINIC | Age: 74
End: 2024-03-20
Payer: MEDICARE

## 2024-03-20 NOTE — TELEPHONE ENCOUNTER
"----- Message from Ivan Patten sent at 3/20/2024  2:38 PM CDT -----  Consult/Advisory:      Name Of Caller: Self      Contact Preference?: [phone]      What is the nature of the call?: Inquiring if Fatemeh has received her blood results yet. Also inquiring about her Prograf      Additional Notes:  "Thank you for all that you do for our patients"         "

## 2024-03-20 NOTE — TELEPHONE ENCOUNTER
Left message for patient that I am returning her call; informed her that labs were received and are awaiting MD review.

## 2024-03-22 ENCOUNTER — TELEPHONE (OUTPATIENT)
Dept: TRANSPLANT | Facility: CLINIC | Age: 74
End: 2024-03-22
Payer: MEDICARE

## 2024-03-22 NOTE — TELEPHONE ENCOUNTER
----- Message from Catina Mcgrath MD sent at 3/22/2024  2:39 PM CDT -----  The Labs are stable - please let patient know.

## 2024-04-30 ENCOUNTER — TELEPHONE (OUTPATIENT)
Dept: TRANSPLANT | Facility: CLINIC | Age: 74
End: 2024-04-30
Payer: MEDICARE

## 2024-05-24 ENCOUNTER — TELEPHONE (OUTPATIENT)
Dept: TRANSPLANT | Facility: CLINIC | Age: 74
End: 2024-05-24
Payer: MEDICARE

## 2024-05-24 NOTE — TELEPHONE ENCOUNTER
Called patient for update re: reported stroke.     Patient reported she had a stroke on Monday 5/20/24. Is currently in [facility]. She said the stroke affected her left arm and leg and she is currently receiving physical and occupational therapy. Patient reported she is supposed to be transferred to a rehab facility sometime next week and will keep our office updated,she said.

## 2024-05-24 NOTE — TELEPHONE ENCOUNTER
Notified by Melissa ROMERO(): she attempted to call for patient lab results due on 5/20/24; notified no labs done.  Notified patient in hospital due to a stroke, she said.   Will try to reach out to patient for update.

## 2024-06-17 ENCOUNTER — TELEPHONE (OUTPATIENT)
Dept: TRANSPLANT | Facility: CLINIC | Age: 74
End: 2024-06-17
Payer: MEDICARE

## 2024-06-17 DIAGNOSIS — Z94.4 S/P LIVER TRANSPLANT: ICD-10-CM

## 2024-06-17 RX ORDER — MYCOPHENOLATE MOFETIL 250 MG/1
CAPSULE ORAL
Qty: 120 CAPSULE | Refills: 11 | Status: SHIPPED | OUTPATIENT
Start: 2024-06-17

## 2024-06-17 NOTE — TELEPHONE ENCOUNTER
----- Message from Haley Dupree sent at 6/17/2024 11:36 AM CDT -----  Regarding: r/s appt for today  Contact: PT  [phone]  The patient called requesting to r/s appt sched for today. Please call to r/s appt    No further information provided      Patient can be contacted @# [phone]

## 2024-06-17 NOTE — TELEPHONE ENCOUNTER
----- Message from Haley Joon sent at 6/17/2024 11:34 AM CDT -----  Regarding: speak to nurse - recent stroke  Contact: PT  [phone]  The patient called requesting to speak to Nurse Mary Us return call to shahid - pt states has had a stroke. Please call at your earliest convenience   No further information provided    Patient can be contacted @# [phone]

## 2024-06-17 NOTE — TELEPHONE ENCOUNTER
Spoke to patient: she reported she is home from Rehab after a stroke.  Will have labs on 6/27 when she goes to her follow up doctor visit, she said.  Also asked for refill on Mycophenolate - to [facility].  Patient reported that her Tacrolimus dose was decreased to 1mg twice a day while in hospital.

## 2024-06-21 ENCOUNTER — TELEPHONE (OUTPATIENT)
Dept: TRANSPLANT | Facility: CLINIC | Age: 74
End: 2024-06-21
Payer: MEDICARE

## 2024-06-21 NOTE — TELEPHONE ENCOUNTER
Returned patient call; informed her that insurance co. Sent denial of PA for Mycophenolate due to she had Medicare covered transplant ,  pharmacy needs to run under part B, not part D. Patient voiced understanding.  Document from [facility] faxed to [facility].

## 2024-06-21 NOTE — TELEPHONE ENCOUNTER
----- Message from Sherif Mackey sent at 6/21/2024 12:22 PM CDT -----  Regarding: Consult/Advisory  Contact: Chetna Stock  Consult/Advisory    Name Of Caller: Chetna Stock       Contact Preference:  [phone] (Mobile)    Nature of call: Patient calling to speak to coordinator regarding having problems not getting her mycophenolate and did not have it for a couple of days now. Requesting a call back.

## 2024-06-27 ENCOUNTER — TELEPHONE (OUTPATIENT)
Dept: TRANSPLANT | Facility: CLINIC | Age: 74
End: 2024-06-27
Payer: MEDICARE

## 2024-06-27 NOTE — TELEPHONE ENCOUNTER
----- Message from Haley Dupree sent at 6/27/2024  3:31 PM CDT -----  Regarding: notify labs done today  Contact: PT  [phone]  The patient called requesting to notify Nurse that she went in and did labs today  No further information provided      Patient can be contacted @# [phone]

## 2024-07-02 LAB
EXT ALBUMIN: 3.9
EXT ALKALINE PHOSPHATASE: 412
EXT ALT: 24
EXT AST: 44
EXT BILIRUBIN TOTAL: 0.5
EXT BUN: 52
EXT CALCIUM: 8.9
EXT CHLORIDE: 106
EXT CO2: 25
EXT CREATININE: 2.5 MG/DL
EXT GFR MDRD AF AMER: 22.6
EXT GGT: 584
EXT GLUCOSE: 111
EXT HEMATOCRIT: 23
EXT HEMOGLOBIN: 6.8
EXT LYMPH%: 17.3
EXT MONOCYTES%: 7.5
EXT PLATELETS: 129
EXT POTASSIUM: 3.9
EXT PROTEIN TOTAL: 6
EXT SEGS%: 75.2
EXT SODIUM: 139 MMOL/L
EXT TACROLIMUS LVL: 1.3
EXT WBC: 3.4

## 2024-07-08 DIAGNOSIS — Z94.4 S/P LIVER TRANSPLANT: ICD-10-CM

## 2024-07-08 RX ORDER — TACROLIMUS 1 MG/1
CAPSULE ORAL
Qty: 120 CAPSULE | Refills: 11 | Status: SHIPPED | OUTPATIENT
Start: 2024-07-08

## 2024-07-08 NOTE — TELEPHONE ENCOUNTER
Patient notified and instructed to increase Prograf dose to 2mg twice a day; repeat labs in 2 weeks(due 7/22/24).  Patient was able to repeat instructions and voiced understanding.

## 2024-07-08 NOTE — TELEPHONE ENCOUNTER
----- Message from Catina Mcgrath MD sent at 7/6/2024  3:19 PM CDT -----  Oh. Increase to 2/2  ----- Message -----  From: Fatemeh Blanco  Sent: 7/5/2024   3:04 PM CDT  To: Catina Mcgrath MD    To 4/4 from 1/1? Or to 2/2?   Just checking to make sure  ----- Message -----  From: Catina Mcgrath MD  Sent: 7/3/2024   3:54 PM CDT  To: Fatemeh Blanco    Please increase. Repeat labs in 2 weeks. She may need a biopsy  ----- Message -----  From: Fatemeh Blanco  Sent: 7/3/2024   2:37 PM CDT  To: Catina Mcgrath MD    Patient reported her Prograf dose was reduced to 1mg bid when in the [facility]hab [facility] after her stroke.  ----- Message -----  From: Catina Mcgrath MD  Sent: 7/3/2024  12:35 PM CDT  To: [facility] Post-Liver Transplant Clinical    The Labs are stable; prograf low- increase to 4/4 from 4/3 and repeat labs in one month - please let patient know.

## 2024-07-31 ENCOUNTER — TELEPHONE (OUTPATIENT)
Dept: TRANSPLANT | Facility: CLINIC | Age: 74
End: 2024-07-31
Payer: MEDICARE

## 2024-07-31 NOTE — TELEPHONE ENCOUNTER
Labs due 7/22/24; request for results faxed to [facility]; fax response received stating no records found for the date requested.  Missed Lab Letter sent to patient.

## 2024-08-01 ENCOUNTER — TELEPHONE (OUTPATIENT)
Dept: TRANSPLANT | Facility: CLINIC | Age: 74
End: 2024-08-01
Payer: MEDICARE

## 2024-08-01 NOTE — TELEPHONE ENCOUNTER
----- Message from Melissa Bunn MA sent at 8/1/2024  1:14 PM CDT -----  Regarding: FW: Consult/Advisory  Contact: [phone]  JUSTO  ----- Message -----  From: Cele Barba  Sent: 8/1/2024  12:56 PM CDT  To: [facility] Post-Liver Transplant Non-Clinical  Subject: Consult/Advisory                                 Consult/Advisory     Name Of Caller: Chetna         Contact Preference: [phone]     Nature of call: Pt calling to advise that she completed blood work on Tuesday 7/30

## 2024-09-30 ENCOUNTER — TELEPHONE (OUTPATIENT)
Dept: TRANSPLANT | Facility: CLINIC | Age: 74
End: 2024-09-30
Payer: MEDICARE

## 2024-10-03 DIAGNOSIS — Z94.4 S/P LIVER TRANSPLANT: ICD-10-CM

## 2024-10-03 RX ORDER — MYCOPHENOLATE MOFETIL 250 MG/1
CAPSULE ORAL
Qty: 120 CAPSULE | Refills: 11 | Status: SHIPPED | OUTPATIENT
Start: 2024-10-03

## 2024-10-03 NOTE — TELEPHONE ENCOUNTER
----- Message from Catina Mcgrath MD sent at 6/11/2018  4:28 PM CDT -----  Decrease prograf to 4/3 from 4/4 and recheck labs in one month - please let patient know.   abnormal lab result

## 2024-10-07 ENCOUNTER — TELEPHONE (OUTPATIENT)
Dept: TRANSPLANT | Facility: CLINIC | Age: 74
End: 2024-10-07
Payer: MEDICARE

## 2024-10-07 DIAGNOSIS — Z94.4 S/P LIVER TRANSPLANT: ICD-10-CM

## 2024-10-07 RX ORDER — MYCOPHENOLATE MOFETIL 250 MG/1
CAPSULE ORAL
Qty: 120 CAPSULE | Refills: 11 | Status: SHIPPED | OUTPATIENT
Start: 2024-10-07

## 2024-10-07 NOTE — TELEPHONE ENCOUNTER
----- Message from Elizabeth sent at 10/7/2024  2:46 PM CDT -----  Regarding: Consult/Advisory  Contact: pt @ [phone]  Consult/Advisory     Name Of Caller: Chetna Stock     Contact Preference:[phone]    Nature of call: message is for Fatemeh pt is calling to get you to call pharmacy to see why they are denying medication mycophenolate (CELLCEPT) 250 mg Cap. Asking for a call back. Have not taken medication

## 2024-10-07 NOTE — TELEPHONE ENCOUNTER
Spoke to patient and pharmacist, will request PA for MMF.   Spoke with pharmacy and they are working on getting these ready for her. Thank you.

## 2024-10-08 ENCOUNTER — TELEPHONE (OUTPATIENT)
Dept: TRANSPLANT | Facility: CLINIC | Age: 74
End: 2024-10-08
Payer: MEDICARE

## 2024-10-08 NOTE — TELEPHONE ENCOUNTER
Returned call to [facility]; pharmacist stated no Divya there, she may be at [facility], unable to give contact info. For the resoulution center he said.

## 2024-10-08 NOTE — TELEPHONE ENCOUNTER
----- Message from Christina sent at 10/8/2024  9:44 AM CDT -----  Regarding: Refill          Name of Pharmacy:    [id number] [place] [address] AT [place] [address] & [address] [address]  Phone: [phone] Fax: [phone]      Name Of caller:   Divya      Name of Medication:   mycophenolate (CELLCEPT) 250 mg Cap    Comments/advisory:ICD-10 code needed

## 2024-10-08 NOTE — TELEPHONE ENCOUNTER
Notified by Delores. PA dept. That patient had Medicare part A at time of transplant so MMF Rx. Needs to be run under Part B not Part D.  Message left with above information to Pharmacy at [facility].

## 2024-10-10 ENCOUNTER — TELEPHONE (OUTPATIENT)
Dept: TRANSPLANT | Facility: CLINIC | Age: 74
End: 2024-10-10
Payer: MEDICARE

## 2024-10-10 NOTE — TELEPHONE ENCOUNTER
1011- Report received from Eliot PINTO.  Pt resting with eyes closed. Awaiting room assignment.  1029- Report to Maciel PINTO, room assignment received.   Returned call patient wanted us to know she had her blood work done.    ----- Message from Christina sent at 10/10/2024  4:27 PM CDT -----  Regarding: Patient advice  Contact: Pt  [phone]            Name of Caller: Chetna      Contact Preference:  [phone]    Nature of Call:  Requesting a call have questions about medication

## 2024-10-11 ENCOUNTER — TELEPHONE (OUTPATIENT)
Dept: TRANSPLANT | Facility: CLINIC | Age: 74
End: 2024-10-11
Payer: MEDICARE

## 2024-10-11 DIAGNOSIS — Z94.4 S/P LIVER TRANSPLANT: ICD-10-CM

## 2024-10-11 NOTE — TELEPHONE ENCOUNTER
----- Message from Dmitri Torres sent at 10/11/2024  3:46 PM CDT -----  Regarding: FW: Returning a Missed Call  Contact: [phone]  Did you call her?  I didn't.  ----- Message -----  From: Cele Barba  Sent: 10/11/2024   3:36 PM CDT  To: [facility] Post-Liver Transplant Non-Clinical  Subject: Returning a Missed Call                            Returning a Missed Call     Caller: pt         Returning call to: Fatemeh Guzmán can be reached @:   [phone]     Nature of the call: Unknown

## 2024-10-11 NOTE — TELEPHONE ENCOUNTER
Returned patient call, informed her spoke to Pharmacy and insurance co. Re: PA for Mycophenolate.  Patient reported she spoke to Medicare/Social Security a few minutes ago.

## 2024-10-14 RX ORDER — MYCOPHENOLATE MOFETIL 250 MG/1
CAPSULE ORAL
Qty: 120 CAPSULE | Refills: 11 | Status: SHIPPED | OUTPATIENT
Start: 2024-10-14

## 2024-10-15 ENCOUNTER — TELEPHONE (OUTPATIENT)
Dept: TRANSPLANT | Facility: CLINIC | Age: 74
End: 2024-10-15
Payer: MEDICARE

## 2024-10-15 LAB
EXT ALBUMIN: 3.9
EXT ALKALINE PHOSPHATASE: 556
EXT ALT: 16
EXT AST: 30
EXT BILIRUBIN TOTAL: 0.4
EXT BUN: 39
EXT CALCIUM: 8.9
EXT CHLORIDE: 106
EXT CO2: 29
EXT CREATININE: 2.1 MG/DL
EXT GFR MDRD AF AMER: 28
EXT GGT: 736
EXT GLUCOSE: 115
EXT HEMATOCRIT: 32.5
EXT HEMOGLOBIN: 9.8
EXT LYMPH%: 22.8
EXT MONOCYTES%: 6.9
EXT PLATELETS: 117
EXT POTASSIUM: 3.8
EXT PROTEIN TOTAL: 6.2
EXT SEGS%: 70.3
EXT SODIUM: 142 MMOL/L
EXT TACROLIMUS LVL: 4.6
EXT WBC: 2.9

## 2024-10-15 NOTE — TELEPHONE ENCOUNTER
Left message for patient asking for a call back re: if she received her Cellcept from the pharmacy.

## 2024-10-15 NOTE — TELEPHONE ENCOUNTER
"Patient returned call; she reported that she was able to get "20 Cellcept pills because that is all he had in stock on Saturday, so he is able to order more and will get them for me" she said.    "

## 2024-10-16 ENCOUNTER — TELEPHONE (OUTPATIENT)
Dept: TRANSPLANT | Facility: CLINIC | Age: 74
End: 2024-10-16
Payer: MEDICARE

## 2024-10-16 NOTE — TELEPHONE ENCOUNTER
Message left for patient with instructions : will need MRI of bile ducts to be scheduled.  Requested call back to schedule.

## 2024-10-16 NOTE — TELEPHONE ENCOUNTER
----- Message from Catina Mcgrath MD sent at 10/16/2024  9:00 AM CDT -----  ALKP and GGT rising. Please schedule MRI keila ovalles contrast_MRCP - please let patient know. May need liver biopsy

## 2024-10-18 DIAGNOSIS — R74.8 ELEVATED ALKALINE PHOSPHATASE LEVEL: Primary | ICD-10-CM

## 2024-10-30 ENCOUNTER — TELEPHONE (OUTPATIENT)
Dept: TRANSPLANT | Facility: CLINIC | Age: 74
End: 2024-10-30
Payer: MEDICARE

## 2024-11-04 RX ORDER — OMEPRAZOLE 40 MG/1
CAPSULE, DELAYED RELEASE ORAL
Qty: 30 CAPSULE | Refills: 11 | Status: SHIPPED | OUTPATIENT
Start: 2024-11-04

## 2024-11-05 ENCOUNTER — TELEPHONE (OUTPATIENT)
Dept: TRANSPLANT | Facility: CLINIC | Age: 74
End: 2024-11-05
Payer: MEDICARE

## 2024-11-05 NOTE — TELEPHONE ENCOUNTER
Called patient, she was admitted to [facility] in [place], [place]. with a small bleed to her brain.  She wanted to make us aware----- Message from [employer] sent at 11/5/2024  3:15 PM CST -----  Regarding: Consult/Advisory  Contact: Chetna Stock     Consult/Advisory     Name Of Caller:Chetna Stock         Contact Preference: [phone]      Nature of call:Patient is calling to let Fatemeh know that she back in hospital and wasn't able to do blood work. Requesting a call back

## 2024-11-12 ENCOUNTER — TELEPHONE (OUTPATIENT)
Dept: TRANSPLANT | Facility: CLINIC | Age: 74
End: 2024-11-12
Payer: MEDICARE

## 2024-11-12 NOTE — TELEPHONE ENCOUNTER
"----- Message from Jonah sent at 11/12/2024 10:15 AM CST -----  Regarding: call back        Reschedule Existing Appointment        Appt Date:11/15     Type of appt: All Appts          Reason for rescheduling?  Requesting to r/s for soonest available     Caller: Self     Contact Preference:[phone]         Additional Information:  "Thank you for all that you do for our patients"  "

## 2024-11-12 NOTE — TELEPHONE ENCOUNTER
"----- Message from Keren Mackey sent at 11/12/2024 10:49 AM CST -----  Regarding: FW: call back    ----- Message -----  From: Jonah Pineda  Sent: 11/12/2024  10:15 AM CST  To: [facility] Post-Liver Transplant Clinical  Subject: call back                                        Consult/Advisory:        Name Of Caller: Self     Contact Preference?:[phone]     What is the nature of the call?: Calling to speak w/  Fatemeh in regards to her being in the hospital requesting call back      Additional Notes:  "Thank you for all that you do for our patients"  "

## 2024-11-12 NOTE — TELEPHONE ENCOUNTER
"Returned patient call; she reported she is currently inpatient at [facility] : she stated that "On Sunday night my left side leg wouldn't move and I couldn't get up, so I got to the ER ", she said.   Patient updated that she has been told by the doctors at the hospital that " I had a mild right sided blood brain bleed".  Patient reports that she is doing well and working with Physical Therapy.  Patient said she will soon be transferred to the Rehab hospital to continue therapy.  She will "have to miss the MRI of my liver this week", she said. Advised patient to call back with contact info. Once transferred to the Rehab Facility. She voiced understanding.   "

## 2024-11-18 ENCOUNTER — TELEPHONE (OUTPATIENT)
Dept: TRANSPLANT | Facility: CLINIC | Age: 74
End: 2024-11-18
Payer: MEDICARE

## 2024-11-18 NOTE — TELEPHONE ENCOUNTER
Returned patient call, she asked for lab order and our fax number to be emailed to her at [email or web address],  so she can give to her doctor.

## 2024-11-18 NOTE — TELEPHONE ENCOUNTER
----- Message from iKONVERSE sent at 11/18/2024  9:37 AM CST -----  Regarding: Consult/Advisory  Contact: Chetna Stock     Consult/Advisory     Name Of Caller:Chetna Stock         Contact Preference:[phone]     Nature of call: Patient is calling to speak to Fatemeh about her doctor drawing blood and where to send it to. Requesting a call back

## 2024-12-30 ENCOUNTER — TELEPHONE (OUTPATIENT)
Dept: TRANSPLANT | Facility: CLINIC | Age: 74
End: 2024-12-30
Payer: MEDICARE

## 2024-12-30 NOTE — TELEPHONE ENCOUNTER
----- Message from Christina sent at 12/30/2024  3:54 PM CST -----  Regarding: Patient advice  Contact: Pt  [phone]        Name of Caller: Chetna      Contact Preference:   [phone]    Nature of Call: Requesting a call back have questions about medication and want results from most recent labs while in the hospital

## 2024-12-30 NOTE — TELEPHONE ENCOUNTER
"Returned patient call; she reported she is back at home from Rehab Hospital, and having home health for PT.  Labs due , she will go to lab on Thursday or Friday this week, she said, at same outpatient lab she uses.  Patient reported continued issues with filling the Mycophenolate , due to insurance she had at time of transplant and issues with insurance companies. Patient gave number for [facility] to call as she reported she "spoke to the man and that's what he said to do", will attempt to contact [facility] number.   "

## 2025-01-06 ENCOUNTER — TELEPHONE (OUTPATIENT)
Dept: TRANSPLANT | Facility: CLINIC | Age: 75
End: 2025-01-06
Payer: MEDICARE

## 2025-01-06 NOTE — TELEPHONE ENCOUNTER
----- Message from Fatemeh Blanco sent at 1/6/2025  3:12 PM CST -----  Regarding: FW: [employer] blood work done today  Contact: Patient can be contacted @#   [phone]    ----- Message -----  From: Haley Dupree  Sent: 1/6/2025   3:08 PM CST  To: [facility] Post-Liver Transplant Clinical  Subject: [employer] blood work done today                    Blood work done today    Patient can be contacted @#   [phone]

## 2025-01-07 ENCOUNTER — TELEPHONE (OUTPATIENT)
Dept: TRANSPLANT | Facility: CLINIC | Age: 75
End: 2025-01-07
Payer: MEDICARE

## 2025-01-07 NOTE — TELEPHONE ENCOUNTER
"Returned patient call ; she reported that she "got my [employer] medicine issue resolved, I got my med.".   "

## 2025-01-08 LAB
EXT ALBUMIN: 3.8
EXT ALKALINE PHOSPHATASE: 379
EXT ALT: 16
EXT AST: 36
EXT BILIRUBIN TOTAL: 0.4
EXT BUN: 38
EXT CALCIUM: 8.7
EXT CHLORIDE: 107
EXT CO2: 27
EXT CREATININE: 2.1 MG/DL
EXT GFR MDRD AF AMER: 28
EXT GGT: 469
EXT GLUCOSE: 116
EXT HEMATOCRIT: 27.4
EXT HEMOGLOBIN: 8.1
EXT LYMPH%: 26.2
EXT MONOCYTES%: 4.9
EXT PLATELETS: 128
EXT POTASSIUM: 4.2
EXT PROTEIN TOTAL: 6
EXT SEGS%: 68.9
EXT SODIUM: 144 MMOL/L
EXT TACROLIMUS LVL: 1.2
EXT WBC: 3

## 2025-01-09 ENCOUNTER — TELEPHONE (OUTPATIENT)
Dept: TRANSPLANT | Facility: CLINIC | Age: 75
End: 2025-01-09
Payer: MEDICARE

## 2025-01-09 NOTE — TELEPHONE ENCOUNTER
----- Message from Catina Mcgrath MD sent at 1/8/2025 11:03 PM CST -----  The Labs are stable; prograf low- repeat in one month - please let patient know.

## 2025-02-07 ENCOUNTER — TELEPHONE (OUTPATIENT)
Dept: TRANSPLANT | Facility: CLINIC | Age: 75
End: 2025-02-07
Payer: MEDICARE

## 2025-02-11 ENCOUNTER — TELEPHONE (OUTPATIENT)
Dept: TRANSPLANT | Facility: CLINIC | Age: 75
End: 2025-02-11
Payer: MEDICARE

## 2025-02-11 NOTE — TELEPHONE ENCOUNTER
----- Message from Med Assistant Torres sent at 2/11/2025  3:17 PM CST -----  Regarding: FW: Consult/Advisory  Contact: [phone]  She just wants you to know she did her lab work.  ----- Message -----  From: Cele Barba  Sent: 2/11/2025   1:58 PM CST  To: [facility] Post-Liver Transplant Non-Clinical  Subject: Consult/Advisory                                 Consult/Advisory     Name Of Caller: pt         Contact Preference:   [phone]     Nature of call: Pt states she completed her blood work on today, she reported that she never received a letter but she hopes, the office get soon. Please call for any further questions thank you

## 2025-02-28 LAB
EXT ALBUMIN: 3.5
EXT ALKALINE PHOSPHATASE: 295
EXT ALT: 27
EXT AST: 60
EXT BILIRUBIN TOTAL: 0.4
EXT BUN: 47
EXT CALCIUM: 8.9
EXT CHLORIDE: 108
EXT CO2: 26
EXT CREATININE: 2.3 MG/DL
EXT GFR MDRD AF AMER: 25
EXT GGT: 272
EXT GLUCOSE: 95
EXT HEMATOCRIT: 27.4
EXT HEMOGLOBIN: 8.4
EXT LYMPH%: 28.4
EXT MONOCYTES%: 9.8
EXT PLATELETS: 87
EXT POTASSIUM: 4.2
EXT PROTEIN TOTAL: 6.3
EXT SEGS%: 61.8
EXT SODIUM: 141 MMOL/L
EXT TACROLIMUS LVL: 3.2
EXT WBC: 2.7

## 2025-03-03 ENCOUNTER — RESULTS FOLLOW-UP (OUTPATIENT)
Dept: TRANSPLANT | Facility: CLINIC | Age: 75
End: 2025-03-03
Payer: MEDICARE

## 2025-03-06 NOTE — TELEPHONE ENCOUNTER
----- Message from Catina Mcgrath MD sent at 3/3/2025  8:26 AM CST -----  The Labs are up a bit- repeat labs now - please let patient know.  ----- Message -----  From: Fatemeh Blanco  Sent: 2/28/2025   4:20 PM CST  To: Catina Mcgrath MD

## 2025-03-06 NOTE — TELEPHONE ENCOUNTER
Patient notified and instructed : labs reviewed, liver tests up some,  repeat labs on Monday 3/10/25. She was able to repeat instructions and voiced understanding.

## 2025-03-14 LAB
EXT ALBUMIN: 3.6
EXT ALKALINE PHOSPHATASE: 311
EXT ALT: 30
EXT AST: 56
EXT BASOPHIL%: 0
EXT BILIRUBIN TOTAL: 0.5
EXT BUN: 39
EXT CALCIUM: 8.9
EXT CHLORIDE: 106
EXT CO2: 28
EXT CREATININE: 2.2 MG/DL
EXT EOSINOPHIL%: 0
EXT GFR MDRD AF AMER: 25.9
EXT GGT: 298
EXT GLUCOSE: 103
EXT HEMATOCRIT: 30
EXT HEMOGLOBIN: 8.9
EXT LYMPH%: 23.6
EXT MONOCYTES%: 9.5
EXT PLATELETS: 121
EXT POTASSIUM: 4.1
EXT PROTEIN TOTAL: 6.2
EXT SEGS%: 66.9
EXT SODIUM: 142 MMOL/L
EXT TACROLIMUS LVL: 3.5
EXT WBC: 3.5

## 2025-03-16 ENCOUNTER — RESULTS FOLLOW-UP (OUTPATIENT)
Dept: TRANSPLANT | Facility: CLINIC | Age: 75
End: 2025-03-16
Payer: MEDICARE

## 2025-03-17 NOTE — TELEPHONE ENCOUNTER
----- Message from Catina Mcgrath MD sent at 3/16/2025  5:16 PM CDT -----  The Labs are improving- repeat labs in one month - please let patient know.  ----- Message -----  From: Brea Stoner RN  Sent: 3/14/2025   9:44 AM CDT  To: Catina Mcgrath MD

## 2025-04-02 RX ORDER — OMEPRAZOLE 40 MG/1
40 CAPSULE, DELAYED RELEASE ORAL DAILY
Qty: 30 CAPSULE | Refills: 11 | Status: SHIPPED | OUTPATIENT
Start: 2025-04-02

## 2025-04-22 LAB
EXT ALBUMIN: 3.7
EXT ALKALINE PHOSPHATASE: 233
EXT ALT: 20
EXT AST: 47
EXT BILIRUBIN TOTAL: 0.5
EXT BUN: 47
EXT CALCIUM: 8.7
EXT CHLORIDE: 105
EXT CO2: 27
EXT CREATININE: 2.2 MG/DL
EXT GFR MDRD AF AMER: 27
EXT GGT: 319
EXT GLUCOSE: 103
EXT HEMATOCRIT: 29.8
EXT HEMOGLOBIN: 9.3
EXT LYMPH%: 30.7
EXT MONOCYTES%: 8.6
EXT PLATELETS: 106
EXT POTASSIUM: 4.1
EXT PROTEIN TOTAL: 6.2
EXT SEGS%: 60.7
EXT SODIUM: 143 MMOL/L
EXT TACROLIMUS LVL: 8.8
EXT WBC: 3.1

## 2025-04-23 ENCOUNTER — RESULTS FOLLOW-UP (OUTPATIENT)
Dept: TRANSPLANT | Facility: CLINIC | Age: 75
End: 2025-04-23
Payer: MEDICARE

## 2025-04-23 DIAGNOSIS — Z94.4 LIVER REPLACED BY TRANSPLANT: Primary | ICD-10-CM

## 2025-04-23 PROCEDURE — 99999 PR PBB SHADOW E&M-EST. PATIENT-LVL I: CPT | Mod: PBBFAC,,, | Performed by: INTERNAL MEDICINE

## 2025-04-23 NOTE — TELEPHONE ENCOUNTER
Called patient to let her know her enzymes are elevated and Dr. Mcgrath is ordering a liver biopsy, liver US and MRCP .  Patient is requesting to have this done at home.

## 2025-04-24 ENCOUNTER — TELEPHONE (OUTPATIENT)
Dept: INTERVENTIONAL RADIOLOGY/VASCULAR | Facility: CLINIC | Age: 75
End: 2025-04-24
Payer: MEDICARE

## 2025-04-24 ENCOUNTER — TELEPHONE (OUTPATIENT)
Dept: TRANSPLANT | Facility: CLINIC | Age: 75
End: 2025-04-24
Payer: MEDICARE

## 2025-04-24 NOTE — TELEPHONE ENCOUNTER
----- Message from Med Assistant Melissa sent at 4/24/2025 11:43 AM CDT -----  Regarding: FW: sched testd in [place]?  Contact: Patient can be contacted @# [phone]  Not sure what test she is referring too.  ----- Message -----  From: Haley Dupree  Sent: 4/24/2025  11:37 AM CDT  To: [facility] Post-Liver Transplant Non-Clinical  Subject: sched testd in [place]?                          PT called asking for Nurse Fatemeh - she is wanting to know if the test she is sched for can be sched in [place]. Please reach out at earliest conveniencePatient can be contacted @# [phone]

## 2025-05-19 NOTE — TELEPHONE ENCOUNTER
Returned patient call; informed her of fax received from [facility]. Reporting that because she had Medicare part A at time of transplant , her immunosuppression refill needs to be run under Part B , not Part D.  Fax also sent to patient's local Pharmacy with information from insurance co [facility].     For information on Fall & Injury Prevention, visit: [email or web address] OR  [email or web address] OR  https://www.cdc.gov/steadi/patient.html

## 2025-06-13 ENCOUNTER — TELEPHONE (OUTPATIENT)
Dept: TRANSPLANT | Facility: CLINIC | Age: 75
End: 2025-06-13
Payer: MEDICARE

## 2025-06-13 NOTE — TELEPHONE ENCOUNTER
Copied from CRM #[id number]. Topic: Appointments - Appointment Access  >> Jun 13, 2025  3:21 PM Christina wrote:  Appt Type:   Test needed      Date/Time Preference:  Next colton in [facility]      Caller Name: Chetna      Contact Prefer: [phone]     Comments/Notes:  Requesting call to schedule test needed in her local area of  [place]

## 2025-06-13 NOTE — TELEPHONE ENCOUNTER
"Returned patient call; she said orders for the u/s / MRIs/ biopsy to be sent to the "[place] office where  is at".  Instructed patient also that labs due on Monday 6/16/25.   "

## 2025-06-24 ENCOUNTER — RESULTS FOLLOW-UP (OUTPATIENT)
Dept: TRANSPLANT | Facility: CLINIC | Age: 75
End: 2025-06-24
Payer: MEDICARE

## 2025-06-24 LAB
EXT ALBUMIN: 3.9
EXT ALKALINE PHOSPHATASE: 501
EXT ALT: 27
EXT AST: 65
EXT BILIRUBIN TOTAL: 0.6
EXT BUN: 46
EXT CALCIUM: 8.8
EXT CHLORIDE: 107
EXT CO2: 27
EXT CREATININE: 2.3 MG/DL
EXT GFR MDRD AF AMER: 25
EXT GGT: 579
EXT GLUCOSE: 108
EXT HEMATOCRIT: 29.8
EXT HEMOGLOBIN: 8.9
EXT LYMPH%: 20.1
EXT MONOCYTES%: 7.6
EXT PLATELETS: 119
EXT POTASSIUM: 4.2
EXT PROTEIN TOTAL: 6.4
EXT SEGS%: 72.3
EXT SODIUM: 142 MMOL/L
EXT TACROLIMUS LVL: 1.8
EXT WBC: 3.4

## 2025-06-25 RX ORDER — PREDNISONE 10 MG/1
20 TABLET ORAL DAILY
Qty: 60 TABLET | Refills: 3 | Status: SHIPPED | OUTPATIENT
Start: 2025-06-25

## 2025-06-25 NOTE — TELEPHONE ENCOUNTER
----- Message from Catina Mcgrath MD sent at 6/24/2025  9:43 PM CDT -----  The Labs are still elevated trial of prednisone 20 mg. Labs in 2 weeks- please let patient know.  ----- Message -----  From: Fatemeh Blanco  Sent: 6/24/2025   9:38 AM CDT  To: Catina Mcgrath MD

## 2025-06-25 NOTE — TELEPHONE ENCOUNTER
Patient notified and instructed: labs reviewed by ; liver tests still elevated,   wants to give her a trial of Prednisone 20mg daily, and repeat labs in 2 weeks(due 7/7/25). Patient voiced understanding and asked for Rx. To be sent to her local [facility].

## 2025-07-15 LAB
EXT ALBUMIN: 3.8
EXT ALKALINE PHOSPHATASE: 235
EXT ALT: 23
EXT AST: 27
EXT BILIRUBIN TOTAL: 0.6
EXT BUN: 55
EXT CALCIUM: 8.8
EXT CHLORIDE: 104
EXT CO2: 26
EXT CREATININE: 2.6 MG/DL
EXT GFR MDRD AF AMER: 22
EXT GLUCOSE: 135
EXT HEMATOCRIT: 30.2
EXT HEMOGLOBIN: 9.5
EXT LYMPH%: 16
EXT MONOCYTES%: 7
EXT PLATELETS: 120
EXT POTASSIUM: 3.8
EXT PROTEIN TOTAL: 6
EXT SEGS%: 77
EXT SODIUM: 140 MMOL/L
EXT TACROLIMUS LVL: 2.1
EXT WBC: 5.1

## 2025-07-21 ENCOUNTER — RESULTS FOLLOW-UP (OUTPATIENT)
Dept: TRANSPLANT | Facility: CLINIC | Age: 75
End: 2025-07-21
Payer: MEDICARE

## 2025-07-23 NOTE — TELEPHONE ENCOUNTER
----- Message from Catina Mcgrath MD sent at 7/21/2025 10:35 AM CDT -----  The Labs are stable - please let patient know.  ----- Message -----  From: Fatemeh Blanco  Sent: 7/15/2025   3:42 PM CDT  To: Catina Mcgrath MD

## 2025-07-31 DIAGNOSIS — Z94.4 S/P LIVER TRANSPLANT: ICD-10-CM

## 2025-08-01 RX ORDER — TACROLIMUS 1 MG/1
2 CAPSULE ORAL
Qty: 120 CAPSULE | Refills: 11 | Status: SHIPPED | OUTPATIENT
Start: 2025-08-01

## 2025-08-06 DIAGNOSIS — Z94.4 S/P LIVER TRANSPLANT: ICD-10-CM

## 2025-08-06 RX ORDER — TACROLIMUS 1 MG/1
2 CAPSULE ORAL EVERY 12 HOURS
Qty: 120 CAPSULE | Refills: 11 | Status: SHIPPED | OUTPATIENT
Start: 2025-08-06

## 2025-08-07 ENCOUNTER — TELEPHONE (OUTPATIENT)
Dept: TRANSPLANT | Facility: CLINIC | Age: 75
End: 2025-08-07
Payer: MEDICARE

## 2025-08-07 NOTE — TELEPHONE ENCOUNTER
Returned call to [facility], they were asking for date of transplant; information provided.

## 2025-08-07 NOTE — TELEPHONE ENCOUNTER
Copied from CRM #[id number]. Topic: Medications - Medication Refill  >> Aug 7, 2025  1:48 PM Med Assistant Man wrote: